# Patient Record
Sex: FEMALE | Race: WHITE | NOT HISPANIC OR LATINO | Employment: FULL TIME | URBAN - METROPOLITAN AREA
[De-identification: names, ages, dates, MRNs, and addresses within clinical notes are randomized per-mention and may not be internally consistent; named-entity substitution may affect disease eponyms.]

---

## 2017-02-08 ENCOUNTER — HOSPITAL ENCOUNTER (EMERGENCY)
Facility: HOSPITAL | Age: 42
Discharge: HOME | End: 2017-02-08
Attending: EMERGENCY MEDICINE | Admitting: EMERGENCY MEDICINE

## 2017-02-08 VITALS
DIASTOLIC BLOOD PRESSURE: 95 MMHG | BODY MASS INDEX: 22.08 KG/M2 | RESPIRATION RATE: 18 BRPM | TEMPERATURE: 98.8 F | HEIGHT: 62 IN | HEART RATE: 69 BPM | OXYGEN SATURATION: 97 % | SYSTOLIC BLOOD PRESSURE: 142 MMHG | WEIGHT: 120 LBS

## 2017-02-08 DIAGNOSIS — J20.9 ACUTE BRONCHITIS, UNSPECIFIED ORGANISM: Primary | ICD-10-CM

## 2017-02-08 DIAGNOSIS — S03.00XA TMJ (DISLOCATION OF TEMPOROMANDIBULAR JOINT), INITIAL ENCOUNTER: ICD-10-CM

## 2017-02-08 PROCEDURE — 99282 EMERGENCY DEPT VISIT SF MDM: CPT

## 2017-02-08 PROCEDURE — 99284 EMERGENCY DEPT VISIT MOD MDM: CPT | Performed by: EMERGENCY MEDICINE

## 2017-02-08 RX ORDER — ONDANSETRON 4 MG/1
4 TABLET, ORALLY DISINTEGRATING ORAL EVERY 6 HOURS PRN
Qty: 20 TABLET | Refills: 0 | Status: SHIPPED | OUTPATIENT
Start: 2017-02-08 | End: 2018-05-16

## 2017-02-08 RX ORDER — AZITHROMYCIN 250 MG/1
TABLET, FILM COATED ORAL
Qty: 6 TABLET | Refills: 0 | Status: SHIPPED | OUTPATIENT
Start: 2017-02-08 | End: 2018-05-16

## 2017-02-08 RX ORDER — BENZONATATE 200 MG/1
200 CAPSULE ORAL 3 TIMES DAILY PRN
Qty: 21 CAPSULE | Refills: 0 | Status: SHIPPED | OUTPATIENT
Start: 2017-02-08 | End: 2017-02-15

## 2017-02-08 RX ORDER — NABUMETONE 750 MG/1
750 TABLET, FILM COATED ORAL 2 TIMES DAILY PRN
Qty: 14 TABLET | Refills: 0 | Status: SHIPPED | OUTPATIENT
Start: 2017-02-08 | End: 2017-02-15

## 2017-02-08 RX ORDER — IBUPROFEN 600 MG/1
600 TABLET ORAL EVERY 6 HOURS PRN
COMMUNITY
End: 2019-04-16

## 2017-02-08 NOTE — DISCHARGE INSTRUCTIONS
Medications as above.  Follow-up as above.  Recommend a soft diet for next 2-3 days secondary to TMJ issues.  Advance as tolerated.  Recommend stop smoking.  Return to ED for medical emergencies.    Hypertension  Hypertension, commonly called high blood pressure, is when the force of blood pumping through your arteries is too strong. Your arteries are the blood vessels that carry blood from your heart throughout your body. A blood pressure reading consists of a higher number over a lower number, such as 110/72. The higher number (systolic) is the pressure inside your arteries when your heart pumps. The lower number (diastolic) is the pressure inside your arteries when your heart relaxes. Ideally you want your blood pressure below 120/80.  Hypertension forces your heart to work harder to pump blood. Your arteries may become narrow or stiff. Having untreated or uncontrolled hypertension can cause heart attack, stroke, kidney disease, and other problems.  RISK FACTORS  Some risk factors for high blood pressure are controllable. Others are not.   Risk factors you cannot control include:   · Race. You may be at higher risk if you are .  · Age. Risk increases with age.  · Gender. Men are at higher risk than women before age 45 years. After age 65, women are at higher risk than men.  Risk factors you can control include:  · Not getting enough exercise or physical activity.  · Being overweight.  · Getting too much fat, sugar, calories, or salt in your diet.  · Drinking too much alcohol.  SIGNS AND SYMPTOMS  Hypertension does not usually cause signs or symptoms. Extremely high blood pressure (hypertensive crisis) may cause headache, anxiety, shortness of breath, and nosebleed.  DIAGNOSIS  To check if you have hypertension, your health care provider will measure your blood pressure while you are seated, with your arm held at the level of your heart. It should be measured at least twice using the same arm.  Certain conditions can cause a difference in blood pressure between your right and left arms. A blood pressure reading that is higher than normal on one occasion does not mean that you need treatment. If it is not clear whether you have high blood pressure, you may be asked to return on a different day to have your blood pressure checked again. Or, you may be asked to monitor your blood pressure at home for 1 or more weeks.  TREATMENT  Treating high blood pressure includes making lifestyle changes and possibly taking medicine. Living a healthy lifestyle can help lower high blood pressure. You may need to change some of your habits.  Lifestyle changes may include:  · Following the DASH diet. This diet is high in fruits, vegetables, and whole grains. It is low in salt, red meat, and added sugars.  · Keep your sodium intake below 2,300 mg per day.  · Getting at least 30-45 minutes of aerobic exercise at least 4 times per week.  · Losing weight if necessary.  · Not smoking.  · Limiting alcoholic beverages.  · Learning ways to reduce stress.  Your health care provider may prescribe medicine if lifestyle changes are not enough to get your blood pressure under control, and if one of the following is true:  · You are 18-59 years of age and your systolic blood pressure is above 140.  · You are 60 years of age or older, and your systolic blood pressure is above 150.  · Your diastolic blood pressure is above 90.  · You have diabetes, and your systolic blood pressure is over 140 or your diastolic blood pressure is over 90.  · You have kidney disease and your blood pressure is above 140/90.  · You have heart disease and your blood pressure is above 140/90.  Your personal target blood pressure may vary depending on your medical conditions, your age, and other factors.  HOME CARE INSTRUCTIONS  · Have your blood pressure rechecked as directed by your health care provider.    · Take medicines only as directed by your health care  provider. Follow the directions carefully. Blood pressure medicines must be taken as prescribed. The medicine does not work as well when you skip doses. Skipping doses also puts you at risk for problems.  · Do not smoke.    · Monitor your blood pressure at home as directed by your health care provider.   SEEK MEDICAL CARE IF:   · You think you are having a reaction to medicines taken.  · You have recurrent headaches or feel dizzy.  · You have swelling in your ankles.  · You have trouble with your vision.  SEEK IMMEDIATE MEDICAL CARE IF:  · You develop a severe headache or confusion.  · You have unusual weakness, numbness, or feel faint.  · You have severe chest or abdominal pain.  · You vomit repeatedly.  · You have trouble breathing.  MAKE SURE YOU:   · Understand these instructions.  · Will watch your condition.  · Will get help right away if you are not doing well or get worse.     This information is not intended to replace advice given to you by your health care provider. Make sure you discuss any questions you have with your health care provider.     Document Released: 12/18/2006 Document Revised: 05/03/2016 Document Reviewed: 10/10/2014  [email or web address] Interactive Patient Education ©2016 [email or web address]

## 2017-02-08 NOTE — ED PROVIDER NOTES
Subjective   Patient is a 41 y.o. female presenting with URI.   History provided by:  Patient  URI   Presenting symptoms: congestion, cough, ear pain, facial pain (R jaw), fever (101.3) and sore throat (mild)    Cough:     Cough characteristics:  Productive    Sputum characteristics:  Green    Severity:  Moderate    Duration:  3 days    Timing:  Intermittent    Progression:  Unchanged    Chronicity:  New  Severity:  Mild  Duration:  3 days  Timing:  Constant  Chronicity:  New  Relieved by:  None tried  Associated symptoms: no headaches and no wheezing    Risk factors: chronic respiratory disease    Risk factors: no immunosuppression, no recent illness and no sick contacts      HPI Narrative:Ms. Pruitt is a 40 yo WF who presents secondary to jaw pain ×1 week, sore throat and URI abdomen ×3 days. Patient reports she has a history of TMJ.  She noted pain in her right jaw 1 week ago.  The pain radiates to her right ear.  The pain has progressively worsened over the past week.  3 days ago patient developed symptoms of URI which include fever to 101.3, sore throat, cough with green sputum, nasal congestion.  Patient has history of COPD and prior pneumonia.  Patient has been taking ibuprofen for jaw pain.  While she reports is somewhat helpful it is causing nausea.  Patient presents for evaluation.  Patient has not seen a physician or dentist about the symptoms until today.  Patient is edentulous. States since she has no teeth why should she see a dentist.      Review of Systems   Constitutional: Positive for fever (101.3). Negative for appetite change and diaphoresis.   HENT: Positive for congestion, ear pain and sore throat (mild).    Eyes: Negative.    Respiratory: Positive for cough. Negative for chest tightness, shortness of breath and wheezing.    Cardiovascular: Negative for chest pain, palpitations and leg swelling.   Genitourinary: Negative.  Negative for difficulty urinating, flank pain, frequency and hematuria.    Musculoskeletal: Negative.    Skin: Negative.  Negative for color change, pallor and rash.   Neurological: Negative.  Negative for dizziness, seizures, syncope and headaches.   Psychiatric/Behavioral: Negative.  Negative for agitation, behavioral problems and hallucinations.       Past Medical History   Diagnosis Date   • Anxiety and depression    • Asthma    • COPD (chronic obstructive pulmonary disease)    • Hypertension    • TMJ (dislocation of temporomandibular joint)        Allergies   Allergen Reactions   • Amoxicillin    • Penicillins        Past Surgical History   Procedure Laterality Date   • Ear tubes     • Tubal abdominal ligation         Family History   Problem Relation Age of Onset   • Hypertension Father    • Alzheimer's disease Paternal Grandmother    • Emphysema Paternal Grandfather        Social History     Social History   • Marital status:      Spouse name: N/A   • Number of children: N/A   • Years of education: N/A     Social History Main Topics   • Smoking status: Heavy Tobacco Smoker     Packs/day: 1.00   • Smokeless tobacco: None   • Alcohol use Yes      Comment: occ   • Drug use: No   • Sexual activity: Yes      Comment: e-sure     Other Topics Concern   • None     Social History Narrative   • None           Objective   Physical Exam   Constitutional: She is oriented to person, place, and time. She appears well-developed and well-nourished. No distress.   41-year-old white female laying in bed.  She appears in good overall health for age.  She is nontoxic in appearance.   HENT:   Head: Normocephalic and atraumatic.   Right Ear: External ear normal.   Left Ear: External ear normal.   Nose: Nose normal.   Mouth/Throat: Oropharynx is clear and moist.   Patient is edentulous   Eyes: Conjunctivae and EOM are normal. Pupils are equal, round, and reactive to light.   Neck: Normal range of motion. Neck supple.   Cardiovascular: Normal rate, regular rhythm, normal heart sounds and intact  distal pulses.  Exam reveals no gallop and no friction rub.    No murmur heard.  Pulmonary/Chest: Effort normal and breath sounds normal.   Abdominal: Soft. Bowel sounds are normal. She exhibits no distension. There is no tenderness.   Musculoskeletal: Normal range of motion.   Neurological: She is alert and oriented to person, place, and time.   Skin: Skin is warm and dry. She is not diaphoretic.   Psychiatric: She has a normal mood and affect. Her behavior is normal.   Nursing note and vitals reviewed.      Procedures         ED Course  ED Course   Comment By Time   02/08/17  9:08 AM  Will treat with Z-Aniceto for bronchitis.  This will also cover for strep although patient's oral exam is unremarkable.  TMs are unremarkable.  I feel patient's jaw pain is secondary to TMJ.  Giving TMJ specialist information. Jaguar Cortez MD 02/08 5424                  MDM  Number of Diagnoses or Management Options  Acute bronchitis, unspecified organism: new and does not require workup  TMJ (dislocation of temporomandibular joint), initial encounter: established and worsening  Risk of Complications, Morbidity, and/or Mortality  Presenting problems: low  Diagnostic procedures: low  Management options: low    Patient Progress  Patient progress: stable      Final diagnoses:   Acute bronchitis, unspecified organism   TMJ (dislocation of temporomandibular joint), initial encounter            Jaguar Cortez MD  02/08/17 0955

## 2018-01-27 ENCOUNTER — HOSPITAL ENCOUNTER (EMERGENCY)
Facility: HOSPITAL | Age: 43
Discharge: HOME | End: 2018-01-27
Attending: EMERGENCY MEDICINE | Admitting: EMERGENCY MEDICINE

## 2018-01-27 ENCOUNTER — APPOINTMENT (OUTPATIENT)
Dept: CT IMAGING | Facility: HOSPITAL | Age: 43
End: 2018-01-27

## 2018-01-27 ENCOUNTER — APPOINTMENT (OUTPATIENT)
Dept: GENERAL RADIOLOGY | Facility: HOSPITAL | Age: 43
End: 2018-01-27

## 2018-01-27 VITALS
BODY MASS INDEX: 21.16 KG/M2 | SYSTOLIC BLOOD PRESSURE: 143 MMHG | HEIGHT: 62 IN | OXYGEN SATURATION: 96 % | WEIGHT: 115 LBS | RESPIRATION RATE: 20 BRPM | TEMPERATURE: 97.7 F | DIASTOLIC BLOOD PRESSURE: 93 MMHG | HEART RATE: 55 BPM

## 2018-01-27 DIAGNOSIS — R91.1 LUNG NODULE: ICD-10-CM

## 2018-01-27 DIAGNOSIS — S09.90XA INJURY OF HEAD, INITIAL ENCOUNTER: ICD-10-CM

## 2018-01-27 DIAGNOSIS — J06.9 UPPER RESPIRATORY TRACT INFECTION, UNSPECIFIED TYPE: Primary | ICD-10-CM

## 2018-01-27 LAB
FLUAV AG NPH QL: NEGATIVE
FLUBV AG NPH QL IA: NEGATIVE

## 2018-01-27 PROCEDURE — 70450 CT HEAD/BRAIN W/O DYE: CPT

## 2018-01-27 PROCEDURE — 99284 EMERGENCY DEPT VISIT MOD MDM: CPT | Performed by: PHYSICIAN ASSISTANT

## 2018-01-27 PROCEDURE — 87804 INFLUENZA ASSAY W/OPTIC: CPT | Performed by: EMERGENCY MEDICINE

## 2018-01-27 PROCEDURE — 99283 EMERGENCY DEPT VISIT LOW MDM: CPT

## 2018-01-27 PROCEDURE — 71046 X-RAY EXAM CHEST 2 VIEWS: CPT

## 2018-01-27 RX ORDER — BENZONATATE 100 MG/1
100 CAPSULE ORAL 3 TIMES DAILY PRN
Qty: 20 CAPSULE | Refills: 0 | Status: SHIPPED | OUTPATIENT
Start: 2018-01-27 | End: 2018-02-03

## 2018-01-27 RX ORDER — METHYLPREDNISOLONE 4 MG/1
TABLET ORAL
Qty: 21 TABLET | Refills: 0 | Status: SHIPPED | OUTPATIENT
Start: 2018-01-27 | End: 2019-04-16

## 2018-01-27 RX ORDER — ALBUTEROL SULFATE 90 UG/1
2 AEROSOL, METERED RESPIRATORY (INHALATION) EVERY 4 HOURS PRN
Qty: 1 INHALER | Refills: 0 | Status: SHIPPED | OUTPATIENT
Start: 2018-01-27 | End: 2021-08-19

## 2018-01-27 RX ORDER — BUDESONIDE AND FORMOTEROL FUMARATE DIHYDRATE 160; 4.5 UG/1; UG/1
2 AEROSOL RESPIRATORY (INHALATION)
Qty: 1 INHALER | Refills: 0 | Status: SHIPPED | OUTPATIENT
Start: 2018-01-27 | End: 2019-04-16

## 2018-01-27 RX ORDER — ONDANSETRON 4 MG/1
4 TABLET, ORALLY DISINTEGRATING ORAL 4 TIMES DAILY PRN
Qty: 12 TABLET | Refills: 0 | Status: SHIPPED | OUTPATIENT
Start: 2018-01-27 | End: 2018-05-16

## 2018-01-27 RX ORDER — BENZONATATE 100 MG/1
100 CAPSULE ORAL ONCE
Status: COMPLETED | OUTPATIENT
Start: 2018-01-27 | End: 2018-01-27

## 2018-01-27 RX ADMIN — BENZONATATE 100 MG: 100 CAPSULE ORAL at 17:51

## 2018-01-27 NOTE — ED PROVIDER NOTES
Subjective   History of Present Illness  History of Present Illness    Chief complaint: Flu symptoms    Location: generalized    Quality/Severity:  Aches, moderate    Timing/Duration: 2 weeks    Modifying Factors: nothing makes worse.  Albuterol helps breathing.     Associated Symptoms: + productive cough with green sputum + rhinorrhea +wheezes.  Denies fevers or chills.  Denies chest pain or shortness of breath.  Denies abdominal pain or nausea/vomiting. Denies sore throat    Narrative: 43 y/o female presents with flulike symptoms that started 2 weeks.  She continues to cough.  She isn't having to use her inhaler much more frequently than usual.  She denies any hemoptysis.  She also states that she recently hit her head on encounter.  She denied any LOC.  She has been having headaches ever since.    Review of Systems  General: Denies fevers or chills.  Denies any weakness or fatigue.  Denies any weight loss or weight gain.  SKIN: Denies any rashes lesions or ulcers.  Denies color change.  ENT: + sore throat + rhinorrhea.  Denies ear pain.    EYES: Denies any blurred vision.  Denies any change in vision.  Denies any photophobia.  Denies any vision loss.  LUNGS: Denies any shortness of breath or wheezing.  + cough.  Denies any hemoptysis.  CARDIAC: Denies any chest pain.  Denies palpitations.  Denies syncope.  Denies any edema  ABD: Denies any abdominal pain.  Denies any nausea or vomiting or diarrhea.  Denies any rectal bleeding.  Denies constipation  : Denies any dysuria, urgency, frequency or hematuria.  Denies discharge.  Denies flank pain.  NEURO: Denies any focal weakness.  Denies headache.  Denies seizures.  Denies changes in speech or difficulty walking.  ENDOCRINE: Denies polydipsia and polyuria  M/S: Denies arthralgias, back pain, myalgias or neck pain  HEME/LYMPH: Negative for adenopathy. Does not bruise/bleed easily.   PSYCH: Negative for suicidal ideas. Denies anxiety or depression  review was  performed in addition to those in the above all other reviews are negative.      Past Medical History:   Diagnosis Date   • Anxiety and depression    • Asthma    • COPD (chronic obstructive pulmonary disease)    • Hypertension    • TMJ (dislocation of temporomandibular joint)        Allergies   Allergen Reactions   • Amoxicillin    • Penicillins        Past Surgical History:   Procedure Laterality Date   • EAR TUBES     • TUBAL ABDOMINAL LIGATION         Family History   Problem Relation Age of Onset   • Hypertension Father    • Alzheimer's disease Paternal Grandmother    • Emphysema Paternal Grandfather        Social History     Social History   • Marital status:      Spouse name: N/A   • Number of children: N/A   • Years of education: N/A     Social History Main Topics   • Smoking status: Heavy Tobacco Smoker     Packs/day: 1.00   • Smokeless tobacco: None   • Alcohol use Yes      Comment: occ   • Drug use: No   • Sexual activity: Yes      Comment: e-sure     Other Topics Concern   • None     Social History Narrative   No current facility-administered medications for this encounter.     Current Outpatient Prescriptions:   •  budesonide-formoterol (SYMBICORT) 160-4.5 MCG/ACT inhaler, Inhale 2 puffs 2 (two) times a day., Disp: 1 inhaler, Rfl: 0  •  Pseudoeph-Doxylamine-DM-APAP (NYQUIL PO), Take 4 teaspoon(s) by mouth Every Night., Disp: , Rfl:   •  albuterol (PROVENTIL HFA;VENTOLIN HFA) 108 (90 BASE) MCG/ACT inhaler, Inhale 2 puffs every 4 (four) hours as needed for wheezing., Disp: 1 inhaler, Rfl: 0  •  azithromycin (ZITHROMAX Z-MATTHEW) 250 MG tablet, Take 2 tablets the first day, then 1 tablet daily for 4 days., Disp: 6 tablet, Rfl: 0  •  azithromycin (ZITHROMAX) 250 MG tablet, Take 2 tablets the first day, then 1 tablet daily for 4 days., Disp: 6 tablet, Rfl: 0  •  cetirizine (ZyrTEC) 10 MG tablet, Take 10 mg by mouth daily., Disp: , Rfl:   •  fluticasone (FLOVENT DISKUS) 50 MCG/BLIST diskus inhaler, Inhale  1 puff 2 (two) times a day., Disp: , Rfl:   •  HYDROcodone-homatropine (HYCODAN) 5-1.5 MG/5ML syrup, Take 5 mL by mouth every 4 (four) hours as needed for cough., Disp: 120 mL, Rfl: 0  •  ibuprofen (ADVIL,MOTRIN) 600 MG tablet, Take 600 mg by mouth Every 6 (Six) Hours As Needed for mild pain (1-3)., Disp: , Rfl:   •  ibuprofen (CHILD IBUPROFEN) 100 MG/5ML suspension, Take 20 mL by mouth every 6 (six) hours as needed for mild pain (1-3) or fever., Disp: 473 mL, Rfl: 0  •  lisinopril (PRINIVIL,ZESTRIL) 10 MG tablet, Take 10 mg by mouth daily., Disp: , Rfl:   •  Omeprazole 20 MG tablet delayed-release, Take 20 mg by mouth Daily., Disp: , Rfl:   •  ondansetron ODT (ZOFRAN ODT) 4 MG disintegrating tablet, Take 1 tablet by mouth Every 6 (Six) Hours As Needed for nausea or vomiting for up to 20 doses., Disp: 20 tablet, Rfl: 0  •  phenylephrine (SUPA-SYNEPHRINE) 1 % nasal spray, 2 drops into each nostril every 6 (six) hours as needed for congestion., Disp: 29.6 mL, Rfl: 0  •  promethazine (PHENERGAN) 25 MG tablet, Take 25 mg by mouth every 6 (six) hours as needed for nausea or vomiting., Disp: , Rfl:   •  psyllium (METAMUCIL) 58.6 % powder, Take by mouth 3 (three) times a day., Disp: , Rfl:   •  sertraline (ZOLOFT) 50 MG tablet, Take 50 mg by mouth daily., Disp: , Rfl:   •  sulfamethoxazole-trimethoprim (BACTRIM DS,SEPTRA DS) 800-160 MG per tablet, Take one tablet by mouth twice daily for 7 days, Disp: 14 tablet, Rfl: 0          Objective   Physical Exam  Vitals:    01/27/18 1612   BP: 151/88   Pulse: 83   Resp: 20   Temp: 98.1 °F (36.7 °C)   SpO2: 96%       GENERAL: Alert and oriented ×4.  No apparent distress. GCS 15  SKIN: Warm, pink and dry  HEENT: Atraumatic normocephalic, TM bilaterally.  Oropharynx clear without pharyngeal erythema, edema or exudate.  No lymphadenopathy.  LUNGS: Clear to auscultation bilaterally without wheezes, rales or rhonchi, no distress  CARDIAC: Regular rate and rhythm.  S1 and S2.  No murmurs,  rubs or gallops.  ABD: Soft, nontender  M/S: MAEW, no deformity  NEURO: No gross focal deficits.  Cranial nerves II through XII are grossly intact  PSYCH: Normal mood and affect    Procedures         ED Course  ED Course      Tessalon given.  Results for orders placed or performed during the hospital encounter of 01/27/18   Influenza Antigen, Rapid - Swab, Nasopharynx   Result Value Ref Range    Influenza A Ag, EIA Negative Negative    Influenza B Ag, EIA Negative Negative     Reviewed CXR. Independently viewed by me. Interpreted by me. Discussed with patient that final radiology read would be done tomorrow by the radiologist. RLL nodule, NAD.  Reviewed head CT. Independently viewed by me. Interpreted by radiologist. Discussed with pt. No acute intracranial findings.  Extensive paranasal sinus thickening.      Discussed pertinent labs and imaging findings with the patient/family.  Patient/Family voiced understanding of need to follow-up for recheck, further testing as needed.  Return to the emergency Department warnings were given.  D/c with refills for albuterol/symbicort.  Medrol dosepak.              MDM  Number of Diagnoses or Management Options  Injury of head, initial encounter: new and requires workup  Upper respiratory tract infection, unspecified type: new and requires workup     Amount and/or Complexity of Data Reviewed  Clinical lab tests: reviewed and ordered  Tests in the radiology section of CPT®: ordered and reviewed  Tests in the medicine section of CPT®: ordered and reviewed  Independent visualization of images, tracings, or specimens: yes    Risk of Complications, Morbidity, and/or Mortality  Presenting problems: moderate  Diagnostic procedures: moderate  Management options: moderate    Patient Progress  Patient progress: improved      Final diagnoses:   Upper respiratory tract infection, unspecified type   Injury of head, initial encounter   Lung nodule       Dictated utilizing Dragon  dictation       Cathie Burris PA-C  01/27/18 1924       Cathie Burris PA-C  01/27/18 1933  Called in Zpak to [facility], pt was there getting rx (symbicort not covered, so changed to Advair).  LM for pt to call, but pt is currently filling prescriptions, so she will also get zpak.     Cathie Burris PA-C  01/28/18 1511  Pt called back.  Updated her on findings.     Cathie Burris PA-C  01/28/18 [id number]

## 2018-01-28 NOTE — ED PROVIDER NOTES
"Subjective   History of Present Illness    Review of Systems    Past Medical History:   Diagnosis Date   • Anxiety and depression    • Asthma    • COPD (chronic obstructive pulmonary disease)    • Hypertension    • TMJ (dislocation of temporomandibular joint)        Allergies   Allergen Reactions   • Amoxicillin    • Penicillins        Past Surgical History:   Procedure Laterality Date   • EAR TUBES     • TUBAL ABDOMINAL LIGATION         Family History   Problem Relation Age of Onset   • Hypertension Father    • Alzheimer's disease Paternal Grandmother    • Emphysema Paternal Grandfather        Social History     Social History   • Marital status:      Spouse name: N/A   • Number of children: N/A   • Years of education: N/A     Social History Main Topics   • Smoking status: Heavy Tobacco Smoker     Packs/day: 1.00   • Smokeless tobacco: None   • Alcohol use Yes      Comment: occ   • Drug use: No   • Sexual activity: Yes      Comment: e-sure     Other Topics Concern   • None     Social History Narrative           Objective   Physical Exam    Procedures         ED Course  ED Course                  MDM    Final diagnoses:   Upper respiratory tract infection, unspecified type   Injury of head, initial encounter   Lung nodule       Addendum: Final radiology report noted a \"probable small patchy infiltrate at the left base\".  Voice message left on the patient's cell phone to return the call.     Damion Mcmillan MD  01/28/18 0925    "

## 2018-01-28 NOTE — DISCHARGE INSTRUCTIONS
Return to the emergency department with worsening symptoms, uncontrolled pain, inability to tolerate oral liquids, fever greater than 101°F not controlled by Tylenol or as needed with emergent concerns.    Take mucinex otc as needed as directed for congestion.  Also use netti pot or saline nasal spray as needed as directed.

## 2018-02-03 ENCOUNTER — HOSPITAL ENCOUNTER (EMERGENCY)
Facility: HOSPITAL | Age: 43
Discharge: HOME | End: 2018-02-03
Attending: EMERGENCY MEDICINE | Admitting: EMERGENCY MEDICINE

## 2018-02-03 ENCOUNTER — APPOINTMENT (OUTPATIENT)
Dept: GENERAL RADIOLOGY | Facility: HOSPITAL | Age: 43
End: 2018-02-03

## 2018-02-03 VITALS
BODY MASS INDEX: 21.1 KG/M2 | TEMPERATURE: 98.2 F | SYSTOLIC BLOOD PRESSURE: 117 MMHG | DIASTOLIC BLOOD PRESSURE: 71 MMHG | HEIGHT: 62 IN | OXYGEN SATURATION: 97 % | HEART RATE: 69 BPM | WEIGHT: 114.64 LBS | RESPIRATION RATE: 18 BRPM

## 2018-02-03 DIAGNOSIS — J01.41 ACUTE RECURRENT PANSINUSITIS: ICD-10-CM

## 2018-02-03 DIAGNOSIS — R11.2 NON-INTRACTABLE VOMITING WITH NAUSEA, UNSPECIFIED VOMITING TYPE: Primary | ICD-10-CM

## 2018-02-03 DIAGNOSIS — J18.9 PNEUMONIA OF RIGHT LOWER LOBE DUE TO INFECTIOUS ORGANISM: ICD-10-CM

## 2018-02-03 LAB
ALBUMIN SERPL-MCNC: 3.5 G/DL (ref 3.5–5.2)
ALBUMIN/GLOB SERPL: 1 G/DL
ALP SERPL-CCNC: 98 U/L (ref 40–129)
ALT SERPL W P-5'-P-CCNC: 11 U/L (ref 5–33)
ANION GAP SERPL CALCULATED.3IONS-SCNC: 9.1 MMOL/L
AST SERPL-CCNC: 20 U/L (ref 5–32)
BACTERIA UR QL AUTO: ABNORMAL /HPF
BASOPHILS # BLD AUTO: 0.01 10*3/MM3 (ref 0–0.2)
BASOPHILS NFR BLD AUTO: 0.2 % (ref 0–2)
BILIRUB SERPL-MCNC: <0.2 MG/DL (ref 0.2–1.2)
BILIRUB UR QL STRIP: NEGATIVE
BUN BLD-MCNC: 6 MG/DL (ref 6–20)
BUN/CREAT SERPL: 9.7 (ref 7–25)
CALCIUM SPEC-SCNC: 8.1 MG/DL (ref 8.6–10.5)
CHLORIDE SERPL-SCNC: 96 MMOL/L (ref 98–107)
CLARITY UR: CLEAR
CO2 SERPL-SCNC: 29.9 MMOL/L (ref 22–29)
COLOR UR: YELLOW
CREAT BLD-MCNC: 0.62 MG/DL (ref 0.57–1)
DEPRECATED RDW RBC AUTO: 43.8 FL (ref 37–54)
EOSINOPHIL # BLD AUTO: 0.01 10*3/MM3 (ref 0.1–0.3)
EOSINOPHIL NFR BLD AUTO: 0.2 % (ref 0–4)
ERYTHROCYTE [DISTWIDTH] IN BLOOD BY AUTOMATED COUNT: 11.9 % (ref 11.5–14.5)
FLUAV AG NPH QL: NEGATIVE
FLUBV AG NPH QL IA: NEGATIVE
GFR SERPL CREATININE-BSD FRML MDRD: 107 ML/MIN/1.73
GLOBULIN UR ELPH-MCNC: 3.6 GM/DL
GLUCOSE BLD-MCNC: 79 MG/DL (ref 65–99)
GLUCOSE UR STRIP-MCNC: NEGATIVE MG/DL
HCT VFR BLD AUTO: 39.5 % (ref 37–47)
HGB BLD-MCNC: 13.2 G/DL (ref 12–16)
HGB UR QL STRIP.AUTO: NEGATIVE
HYALINE CASTS UR QL AUTO: ABNORMAL /LPF
IMM GRANULOCYTES # BLD: 0.01 10*3/MM3 (ref 0–0.03)
IMM GRANULOCYTES NFR BLD: 0.2 % (ref 0–0.5)
KETONES UR QL STRIP: NEGATIVE
LEUKOCYTE ESTERASE UR QL STRIP.AUTO: NEGATIVE
LYMPHOCYTES # BLD AUTO: 1.52 10*3/MM3 (ref 0.6–4.8)
LYMPHOCYTES NFR BLD AUTO: 23.1 % (ref 20–45)
MCH RBC QN AUTO: 33.5 PG (ref 27–31)
MCHC RBC AUTO-ENTMCNC: 33.4 G/DL (ref 31–37)
MCV RBC AUTO: 100.3 FL (ref 81–99)
MONOCYTES # BLD AUTO: 0.52 10*3/MM3 (ref 0–1)
MONOCYTES NFR BLD AUTO: 7.9 % (ref 3–8)
MUCOUS THREADS URNS QL MICRO: ABNORMAL /HPF
NEUTROPHILS # BLD AUTO: 4.52 10*3/MM3 (ref 1.5–8.3)
NEUTROPHILS NFR BLD AUTO: 68.4 % (ref 45–70)
NITRITE UR QL STRIP: NEGATIVE
NRBC BLD MANUAL-RTO: 0 /100 WBC (ref 0–0)
PH UR STRIP.AUTO: 6 [PH] (ref 4.5–8)
PLATELET # BLD AUTO: 224 10*3/MM3 (ref 140–500)
PMV BLD AUTO: 9.9 FL (ref 7.4–10.4)
POTASSIUM BLD-SCNC: 2.9 MMOL/L (ref 3.5–5.2)
PROT SERPL-MCNC: 7.1 G/DL (ref 6–8.5)
PROT UR QL STRIP: ABNORMAL
RBC # BLD AUTO: 3.94 10*6/MM3 (ref 4.2–5.4)
RBC # UR: ABNORMAL /HPF
REF LAB TEST METHOD: ABNORMAL
SODIUM BLD-SCNC: 135 MMOL/L (ref 136–145)
SP GR UR STRIP: 1.02 (ref 1–1.03)
SQUAMOUS #/AREA URNS HPF: ABNORMAL /HPF
UROBILINOGEN UR QL STRIP: ABNORMAL
WBC NRBC COR # BLD: 6.59 10*3/MM3 (ref 4.8–10.8)
WBC UR QL AUTO: ABNORMAL /HPF

## 2018-02-03 PROCEDURE — 87086 URINE CULTURE/COLONY COUNT: CPT | Performed by: EMERGENCY MEDICINE

## 2018-02-03 PROCEDURE — 71046 X-RAY EXAM CHEST 2 VIEWS: CPT

## 2018-02-03 PROCEDURE — 99284 EMERGENCY DEPT VISIT MOD MDM: CPT | Performed by: EMERGENCY MEDICINE

## 2018-02-03 PROCEDURE — 99283 EMERGENCY DEPT VISIT LOW MDM: CPT

## 2018-02-03 PROCEDURE — 85025 COMPLETE CBC W/AUTO DIFF WBC: CPT | Performed by: EMERGENCY MEDICINE

## 2018-02-03 PROCEDURE — 80053 COMPREHEN METABOLIC PANEL: CPT | Performed by: EMERGENCY MEDICINE

## 2018-02-03 PROCEDURE — 87804 INFLUENZA ASSAY W/OPTIC: CPT | Performed by: EMERGENCY MEDICINE

## 2018-02-03 PROCEDURE — 81001 URINALYSIS AUTO W/SCOPE: CPT | Performed by: EMERGENCY MEDICINE

## 2018-02-03 RX ORDER — POTASSIUM CHLORIDE 750 MG/1
10 TABLET, FILM COATED, EXTENDED RELEASE ORAL 2 TIMES DAILY
Qty: 20 TABLET | Refills: 0 | Status: SHIPPED | OUTPATIENT
Start: 2018-02-03 | End: 2019-04-16

## 2018-02-03 RX ORDER — CEPHALEXIN 500 MG/1
500 CAPSULE ORAL 3 TIMES DAILY
Qty: 30 CAPSULE | Refills: 0 | Status: SHIPPED | OUTPATIENT
Start: 2018-02-03 | End: 2018-05-16

## 2018-02-03 RX ORDER — POTASSIUM CHLORIDE 20 MEQ/1
40 TABLET, EXTENDED RELEASE ORAL ONCE
Status: COMPLETED | OUTPATIENT
Start: 2018-02-03 | End: 2018-02-03

## 2018-02-03 RX ORDER — ONDANSETRON 4 MG/1
4 TABLET, ORALLY DISINTEGRATING ORAL ONCE
Status: COMPLETED | OUTPATIENT
Start: 2018-02-03 | End: 2018-02-03

## 2018-02-03 RX ORDER — ONDANSETRON 2 MG/ML
4 INJECTION INTRAMUSCULAR; INTRAVENOUS ONCE
Status: DISCONTINUED | OUTPATIENT
Start: 2018-02-03 | End: 2018-02-03

## 2018-02-03 RX ADMIN — POTASSIUM CHLORIDE 40 MEQ: 1500 TABLET, EXTENDED RELEASE ORAL at 21:29

## 2018-02-03 RX ADMIN — ONDANSETRON 4 MG: 4 TABLET, ORALLY DISINTEGRATING ORAL at 20:43

## 2018-02-04 NOTE — ED NOTES
"Pt states that she \"feels a little better\" and was provided sprite.  Appears to be tolerating well.     Karol Wood RN  02/03/18 5013    "

## 2018-02-04 NOTE — DISCHARGE INSTRUCTIONS
Community-Acquired Pneumonia, Adult  Pneumonia is an infection of the lungs. There are different types of pneumonia. One type can develop while a person is in a hospital. A different type, called community-acquired pneumonia, develops in people who are not, or have not recently been, in the hospital or other health care facility.  What are the causes?  Pneumonia may be caused by bacteria, viruses, or funguses. Community-acquired pneumonia is often caused by Streptococcus pneumonia bacteria. These bacteria are often passed from one person to another by breathing in droplets from the cough or sneeze of an infected person.  What increases the risk?  The condition is more likely to develop in:  · People who have chronic diseases, such as chronic obstructive pulmonary disease (COPD), asthma, congestive heart failure, cystic fibrosis, diabetes, or kidney disease.  · People who have early-stage or late-stage HIV.  · People who have sickle cell disease.  · People who have had their spleen removed (splenectomy).  · People who have poor dental hygiene.  · People who have medical conditions that increase the risk of breathing in (aspirating) secretions their own mouth and nose.  · People who have a weakened immune system (immunocompromised).  · People who smoke.  · People who travel to areas where pneumonia-causing germs commonly exist.  · People who are around animal habitats or animals that have pneumonia-causing germs, including birds, bats, rabbits, cats, and farm animals.  What are the signs or symptoms?  Symptoms of this condition include:  · A dry cough.  · A wet (productive) cough.  · Fever.  · Sweating.  · Chest pain, especially when breathing deeply or coughing.  · Rapid breathing or difficulty breathing.  · Shortness of breath.  · Shaking chills.  · Fatigue.  · Muscle aches.  How is this diagnosed?  Your health care provider will take a medical history and perform a physical exam. You may also have other tests,  including:  · Imaging studies of your chest, including X-rays.  · Tests to check your blood oxygen level and other blood gases.  · Other tests on blood, mucus (sputum), fluid around your lungs (pleural fluid), and urine.  If your pneumonia is severe, other tests may be done to identify the specific cause of your illness.  How is this treated?  The type of treatment that you receive depends on many factors, such as the cause of your pneumonia, the medicines you take, and other medical conditions that you have. For most adults, treatment and recovery from pneumonia may occur at home. In some cases, treatment must happen in a hospital. Treatment may include:  · Antibiotic medicines, if the pneumonia was caused by bacteria.  · Antiviral medicines, if the pneumonia was caused by a virus.  · Medicines that are given by mouth or through an IV tube.  · Oxygen.  · Respiratory therapy.  Although rare, treating severe pneumonia may include:  · Mechanical ventilation. This is done if you are not breathing well on your own and you cannot maintain a safe blood oxygen level.  · Thoracentesis. This procedure removes fluid around one lung or both lungs to help you breathe better.  Follow these instructions at home:  · Take over-the-counter and prescription medicines only as told by your health care provider.  ¨ Only take cough medicine if you are losing sleep. Understand that cough medicine can prevent your body’s natural ability to remove mucus from your lungs.  ¨ If you were prescribed an antibiotic medicine, take it as told by your health care provider. Do not stop taking the antibiotic even if you start to feel better.  · Sleep in a semi-upright position at night. Try sleeping in a reclining chair, or place a few pillows under your head.  · Do not use tobacco products, including cigarettes, chewing tobacco, and e-cigarettes. If you need help quitting, ask your health care provider.  · Drink enough water to keep your urine clear  or pale yellow. This will help to thin out mucus secretions in your lungs.  How is this prevented?  There are ways that you can decrease your risk of developing community-acquired pneumonia. Consider getting a pneumococcal vaccine if:  · You are older than 65 years of age.  · You are older than 19 years of age and are undergoing cancer treatment, have chronic lung disease, or have other medical conditions that affect your immune system. Ask your health care provider if this applies to you.  There are different types and schedules of pneumococcal vaccines. Ask your health care provider which vaccination option is best for you.  You may also prevent community-acquired pneumonia if you take these actions:  · Get an influenza vaccine every year. Ask your health care provider which type of influenza vaccine is best for you.  · Go to the dentist on a regular basis.  · Wash your hands often. Use hand  if soap and water are not available.  Contact a health care provider if:  · You have a fever.  · You are losing sleep because you cannot control your cough with cough medicine.  Get help right away if:  · You have worsening shortness of breath.  · You have increased chest pain.  · Your sickness becomes worse, especially if you are an older adult or have a weakened immune system.  · You cough up blood.  This information is not intended to replace advice given to you by your health care provider. Make sure you discuss any questions you have with your health care provider.  Document Released: 12/18/2006 Document Revised: 04/27/2017 Document Reviewed: 04/13/2016  [employer] Patient Education © 2017 [employer]      Sinusitis, Adult  Sinusitis is soreness and inflammation of your sinuses. Sinuses are hollow spaces in the bones around your face. They are located:  · Around your eyes.  · In the middle of your forehead.  · Behind your nose.  · In your cheekbones.  Your sinuses and nasal passages are lined with a  stringy fluid (mucus). Mucus normally drains out of your sinuses. When your nasal tissues get inflamed or swollen, the mucus can get trapped or blocked so air cannot flow through your sinuses. This lets bacteria, viruses, and funguses grow, and that leads to infection.  Follow these instructions at home:  Medicines  · Take, use, or apply over-the-counter and prescription medicines only as told by your doctor. These may include nasal sprays.  · If you were prescribed an antibiotic medicine, take it as told by your doctor. Do not stop taking the antibiotic even if you start to feel better.  Hydrate and Humidify  · Drink enough water to keep your pee (urine) clear or pale yellow.  · Use a cool mist humidifier to keep the humidity level in your home above 50%.  · Breathe in steam for 10-15 minutes, 3-4 times a day or as told by your doctor. You can do this in the bathroom while a hot shower is running.  · Try not to spend time in cool or dry air.  Rest  · Rest as much as possible.  · Sleep with your head raised (elevated).  · Make sure to get enough sleep each night.  General instructions  · Put a warm, moist washcloth on your face 3-4 times a day or as told by your doctor. This will help with discomfort.  · Wash your hands often with soap and water. If there is no soap and water, use hand .  · Do not smoke. Avoid being around people who are smoking (secondhand smoke).  · Keep all follow-up visits as told by your doctor. This is important.  Contact a doctor if:  · You have a fever.  · Your symptoms get worse.  · Your symptoms do not get better within 10 days.  Get help right away if:  · You have a very bad headache.  · You cannot stop throwing up (vomiting).  · You have pain or swelling around your face or eyes.  · You have trouble seeing.  · You feel confused.  · Your neck is stiff.  · You have trouble breathing.  This information is not intended to replace advice given to you by your health care provider.  Make sure you discuss any questions you have with your health care provider.  Document Released: 06/05/2009 Document Revised: 08/13/2017 Document Reviewed: 10/12/2016  [employer] Patient Education © 2017 [employer]  Follow-up with Dr. Larsen on Monday.  Return to emerge department if there is vomiting not controlled with the Zofran, vomiting blood, shortness of breath, worsening headache, worse in any way at all.

## 2018-02-04 NOTE — ED PROVIDER NOTES
Subjective   History of Present Illness  History of Present Illness    Chief complaint: Cough, fever, chills    Location: Home    Quality/Severity:  MAXIMUM TEMPERATURE of 102.9    Timing/Onset/Duration: Patient began feeling worse over the last couple of days    Modifying Factors: Nothing seems to make it better, seems to have gotten worse over time    Associated Symptoms: Patient complains of left frontal headache.  Patient complains of MAXIMUM TEMPERATURE of 102.9 day.  He has had chills.  She's had cough.  She has coughed so for full that she throws up.  She denies any sore throat or earache.  She does have nasal congestion.  There is no chest pain or shortness of breath.  No abdominal pain.  No diarrhea or burning when she urinates.  There is no blood in her emesis.    Narrative: This 42-year-old white female, who smokes, and has a history of asthma, presents with fever, chills, and cough.  The patient states that she's had a cough fever and congestion with a left frontal headache for last 2 weeks.  She was seen here on January 27, 2018 and diagnosed with upper respiratory infection.  She also had a lung nodule and fell and had sustained a head injury.  CAT scan of the head at that time was unremarkable except for an acute and chronic finding of pansinusitis..  On the radiology over read of the chest x-ray a pneumonia was noted.  The patient was called in a Z-Aniceto. She finished the Z-Aniceto.  She presents today with fever of 102.9 by history.  The patient complains of left frontal headache.  She has congestion.  She has cough.  No sore throat or earache.  No chest pain or shortness of breath.  No abdominal pain.  No diarrhea or burning when she urinates.  She has body aches.  She has chills.  The patient has an appointment with her primary care provider, Dr. Larsen, on Monday.  She has not seen her primary care provider since she started feeling sick.    PCP:  [facility]      Review of Systems   Constitutional: Positive for  chills and fever.   HENT: Positive for congestion. Negative for ear pain and sore throat.    Eyes: Negative for pain and discharge.   Respiratory: Positive for cough. Negative for chest tightness and shortness of breath.    Cardiovascular: Negative for chest pain and leg swelling.   Gastrointestinal: Positive for vomiting (with forceful cough). Negative for abdominal pain, blood in stool, constipation, diarrhea and nausea.   Genitourinary: Negative for dysuria.   Musculoskeletal: Negative for back pain.   Skin: Negative for rash.   Neurological: Positive for weakness (generalized) and headaches. Negative for numbness.   Hematological: Negative for adenopathy.   Psychiatric/Behavioral: Negative for confusion.        Medication List      ASK your doctor about these medications          albuterol 108 (90 Base) MCG/ACT inhaler   Commonly known as:  PROVENTIL HFA;VENTOLIN HFA   Inhale 2 puffs Every 4 (Four) Hours As Needed for Wheezing.       * azithromycin 250 MG tablet   Commonly known as:  ZITHROMAX   Take 2 tablets the first day, then 1 tablet daily for 4 days.       * azithromycin 250 MG tablet   Commonly known as:  ZITHROMAX Z-MATTHEW   Take 2 tablets the first day, then 1 tablet daily for 4 days.       benzonatate 100 MG capsule   Commonly known as:  TESSALON   Take 1 capsule by mouth 3 (Three) Times a Day As Needed for Cough for up   to 7 days.       budesonide-formoterol 160-4.5 MCG/ACT inhaler   Commonly known as:  SYMBICORT   Inhale 2 puffs 2 (Two) Times a Day.       cetirizine 10 MG tablet   Commonly known as:  zyrTEC       fluticasone 50 MCG/BLIST diskus inhaler   Commonly known as:  FLOVENT DISKUS       HYDROcodone-homatropine 5-1.5 MG/5ML syrup   Commonly known as:  HYCODAN   Take 5 mL by mouth every 4 (four) hours as needed for cough.       * ibuprofen 600 MG tablet   Commonly known as:  ADVIL,MOTRIN       * ibuprofen 100 MG/5ML suspension   Commonly known as:  CHILD IBUPROFEN   Take 20 mL by mouth every 6  (six) hours as needed for mild pain (1-3) or   fever.       lisinopril 10 MG tablet   Commonly known as:  PRINIVIL,ZESTRIL       MethylPREDNISolone 4 MG tablet   Commonly known as:  MEDROL (MATTHEW)   Take as directed on package instructions.       NYQUIL PO       Omeprazole 20 MG tablet delayed-release       * ondansetron ODT 4 MG disintegrating tablet   Commonly known as:  ZOFRAN ODT   Take 1 tablet by mouth Every 6 (Six) Hours As Needed for nausea or   vomiting for up to 20 doses.       * ondansetron ODT 4 MG disintegrating tablet   Commonly known as:  ZOFRAN-ODT   Take 1 tablet by mouth 4 (Four) Times a Day As Needed for Nausea or   Vomiting.       phenylephrine 1 % nasal spray   Commonly known as:  SUPA-SYNEPHRINE   2 drops into each nostril every 6 (six) hours as needed for congestion.       promethazine 25 MG tablet   Commonly known as:  PHENERGAN       psyllium 58.6 % powder   Commonly known as:  METAMUCIL       sertraline 50 MG tablet   Commonly known as:  ZOLOFT       sulfamethoxazole-trimethoprim 800-160 MG per tablet   Commonly known as:  BACTRIM DS,SEPTRA DS   Take one tablet by mouth twice daily for 7 days       * Notice:  This list has 6 medication(s) that are the same as other   medications prescribed for you. Read the directions carefully, and ask   your doctor or other care provider to review them with you.        Past Medical History:   Diagnosis Date   • Anxiety and depression    • Asthma    • COPD (chronic obstructive pulmonary disease)    • Hypertension    • TMJ (dislocation of temporomandibular joint)        Allergies   Allergen Reactions   • Amoxicillin    • Penicillins        Past Surgical History:   Procedure Laterality Date   • EAR TUBES     • TUBAL ABDOMINAL LIGATION         Family History   Problem Relation Age of Onset   • Hypertension Father    • Alzheimer's disease Paternal Grandmother    • Emphysema Paternal Grandfather        Social History     Social History   • Marital status:       Spouse name: N/A   • Number of children: N/A   • Years of education: N/A     Social History Main Topics   • Smoking status: Heavy Tobacco Smoker     Packs/day: 1.00   • Smokeless tobacco: Not on file   • Alcohol use Yes      Comment: occ   • Drug use: No   • Sexual activity: Yes      Comment: e-sure     Other Topics Concern   • Not on file     Social History Narrative           Objective   Physical Exam   Constitutional: She is oriented to person, place, and time. She appears well-developed and well-nourished. No distress.   ED Triage Vitals:  Temp: 98.2 °F (36.8 °C) (02/03/18 1927)  Heart Rate: 70 (02/03/18 1927)  Resp: 20 (02/03/18 1927)  BP: 117/73 (02/03/18 1927)  SpO2: 98 % (02/03/18 1927)  Temp src: Oral (02/03/18 1927)  Heart Rate Source: n/a  Patient Position: n/a  BP Location: Right arm (02/03/18 1927)  FiO2 (%): n/a    The patient's vitals were reviewed by me.  Unless otherwise noted they are within normal limits.     HENT:   Head: Normocephalic and atraumatic.   Right Ear: External ear normal.   Left Ear: External ear normal.   Nose: Nose normal.   Mouth/Throat: Oropharynx is clear and moist.   Eyes: Conjunctivae and EOM are normal. Pupils are equal, round, and reactive to light. Right eye exhibits no discharge. Left eye exhibits no discharge.   Neck: Normal range of motion. Neck supple. No JVD present. No tracheal deviation present. No thyromegaly present.   No meningeal signs   Cardiovascular: Normal rate, regular rhythm, normal heart sounds and intact distal pulses.  Exam reveals no gallop and no friction rub.    No murmur heard.  Pulmonary/Chest: Effort normal and breath sounds normal. No stridor. No respiratory distress. She has no wheezes. She has no rales. She exhibits no tenderness.   Abdominal: Soft. Bowel sounds are normal. She exhibits no distension and no mass. There is no tenderness. There is no rebound and no guarding. No hernia.   Musculoskeletal: Normal range of motion. She exhibits no  edema or deformity.   Lymphadenopathy:     She has no cervical adenopathy.   Neurological: She is alert and oriented to person, place, and time. No cranial nerve deficit. She exhibits normal muscle tone. Coordination normal.   Skin: Skin is warm and dry. No rash noted. She is not diaphoretic. No erythema. No pallor.   Psychiatric: Her behavior is normal.   Nursing note and vitals reviewed.      Procedures         ED Course  ED Course   Comment By Time   The laboratory values were reviewed by me.  The urine microscopic shows 3-5 day BCs, 1+ bacteria, 7-12 squamous epithelial cells.  The sodium is 135.  Testing is 2.9.  The chloride is 96.  The CO2 is 29.9.  The calciums 8.1.  The total bilirubin is less than 0.2.  The laboratory values are otherwise unremarkable. Luc Johnson MD 02/03 2050    9:11 PM, 02/03/18:  The patient reassessed.  She tolerated clear liquids.  Her vital signs were reviewed and are stable.  Abdominal exam: Soft nontender no masses positive bowel sounds.    9:12 PM, 02/03/18:  The Jordy report was reviewed by me.  The report number is [id number].    9:12 PM, 02/03/18:  The patient's diagnosis of pneumonia, and hypokalemia was discussed with her.  Given potassium here in emergency department and a prescription for potassium.  The patient should drink clear liquids for 24-48 hours and advance her diet as tolerated.  Will write her prescription for Hycodan.  She should follow-up with Dr. Larsen on Monday as scheduled.  He should return to emergency department if she has vomiting not controlled with the Zofran, vomiting blood, bloody diarrhea, worse in any way at all.  All the patient's questions were answered she will be discharged in good condition.              MDM  XR Chest 2 View    (Results Pending)     Labs Reviewed   INFLUENZA ANTIGEN, RAPID   COMPREHENSIVE METABOLIC PANEL   URINALYSIS W/ CULTURE IF INDICATED   CBC WITH AUTO DIFFERENTIAL   CBC AND DIFFERENTIAL    Narrative:     The following  orders were created for panel order CBC & Differential.  Procedure                               Abnormality         Status                     ---------                               -----------         ------                     CBC Auto Differential[[id number]]                                                         Please view results for these tests on the individual orders.     Xr Chest 2 View    Result Date: 1/28/2018  Narrative: INDICATION: Cough and congestion for the past week  TECHNIQUE: 2 views the chest were obtained and compared with 05/19/2016  FINDINGS: Bony structures are unremarkable. The heart and mediastinum have a normal configuration. There is a 1 cm nodule at the right costophrenic angle that is indeterminate. There is a questionable a patchy infiltrate at the left lung base is well. Both findings are nonspecific and could reflect either inflammatory disease fibrosis or even early malignancy for the right nodule. The lung bases were clear on the previous examination. Recommend follow-up chest radiography to document clearing at both lung bases. If the abnormalities failed to clear CT of the chest would be useful for further evaluation.      Impression: 1 cm indeterminate nodule right lung base. Probable small patchy infiltrate at the left base. Pneumonia or pneumonitis is suspected. The right base nodule is indeterminate. Recommend radiographic follow-up to document clearing. Review of ED records suggests that the above left base finding may not been noted at the time of care. A finalized copy of this report was sent to the ED at 7:30 AM on 01/28/2018 with telephone notification and documentation.  This report was finalized on 1/28/2018 7:35 AM by Dr. Armani Britton MD.      Ct Head Without Contrast    Result Date: 1/28/2018  Narrative: HEAD CT WITHOUT CONTRAST  HISTORY: Trauma to the 4 head one month ago with intermittent headaches blurred vision and dizziness since. Congestion for the past  week.  TECHNIQUE: Axial images were obtained without contrast. No previous CTs or nuclear cardiac studies over the past year. Radiation dose reduction techniques were utilized, including automated exposure control and exposure modulation based on body size.  FINDINGS: Brain images are normal with no evidence of mass lesion hemorrhage or edema. No midline shift is noted. Extra-axial structures are remarkable for mucosal thickening in the paranasal sinuses with bilateral maxillary sinus air-fluid levels.      Impression: Negative brain CT. Mixed chronic and acute pansinusitis  This report was finalized on 1/28/2018 7:29 AM by Dr. Armani Britton MD.        Final diagnoses:   None         ED Medications:  Medications - No data to display    New Medications:     Medication List      ASK your doctor about these medications          albuterol 108 (90 Base) MCG/ACT inhaler   Commonly known as:  PROVENTIL HFA;VENTOLIN HFA   Inhale 2 puffs Every 4 (Four) Hours As Needed for Wheezing.       * azithromycin 250 MG tablet   Commonly known as:  ZITHROMAX   Take 2 tablets the first day, then 1 tablet daily for 4 days.       * azithromycin 250 MG tablet   Commonly known as:  ZITHROMAX Z-MATTHEW   Take 2 tablets the first day, then 1 tablet daily for 4 days.       benzonatate 100 MG capsule   Commonly known as:  TESSALON   Take 1 capsule by mouth 3 (Three) Times a Day As Needed for Cough for up   to 7 days.       budesonide-formoterol 160-4.5 MCG/ACT inhaler   Commonly known as:  SYMBICORT   Inhale 2 puffs 2 (Two) Times a Day.       cetirizine 10 MG tablet   Commonly known as:  zyrTEC       fluticasone 50 MCG/BLIST diskus inhaler   Commonly known as:  FLOVENT DISKUS       HYDROcodone-homatropine 5-1.5 MG/5ML syrup   Commonly known as:  HYCODAN   Take 5 mL by mouth every 4 (four) hours as needed for cough.       * ibuprofen 600 MG tablet   Commonly known as:  ADVIL,MOTRIN       * ibuprofen 100 MG/5ML suspension   Commonly known as:  CHILD  IBUPROFEN   Take 20 mL by mouth every 6 (six) hours as needed for mild pain (1-3) or   fever.       lisinopril 10 MG tablet   Commonly known as:  PRINIVIL,ZESTRIL       MethylPREDNISolone 4 MG tablet   Commonly known as:  MEDROL (MATTHEW)   Take as directed on package instructions.       NYQUIL PO       Omeprazole 20 MG tablet delayed-release       * ondansetron ODT 4 MG disintegrating tablet   Commonly known as:  ZOFRAN ODT   Take 1 tablet by mouth Every 6 (Six) Hours As Needed for nausea or   vomiting for up to 20 doses.       * ondansetron ODT 4 MG disintegrating tablet   Commonly known as:  ZOFRAN-ODT   Take 1 tablet by mouth 4 (Four) Times a Day As Needed for Nausea or   Vomiting.       phenylephrine 1 % nasal spray   Commonly known as:  SUPA-SYNEPHRINE   2 drops into each nostril every 6 (six) hours as needed for congestion.       promethazine 25 MG tablet   Commonly known as:  PHENERGAN       psyllium 58.6 % powder   Commonly known as:  METAMUCIL       sertraline 50 MG tablet   Commonly known as:  ZOLOFT       sulfamethoxazole-trimethoprim 800-160 MG per tablet   Commonly known as:  BACTRIM DS,SEPTRA DS   Take one tablet by mouth twice daily for 7 days       * Notice:  This list has 6 medication(s) that are the same as other   medications prescribed for you. Read the directions carefully, and ask   your doctor or other care provider to review them with you.        Stopped Medications:     Medication List      ASK your doctor about these medications          albuterol 108 (90 Base) MCG/ACT inhaler   Commonly known as:  PROVENTIL HFA;VENTOLIN HFA   Inhale 2 puffs Every 4 (Four) Hours As Needed for Wheezing.       * azithromycin 250 MG tablet   Commonly known as:  ZITHROMAX   Take 2 tablets the first day, then 1 tablet daily for 4 days.       * azithromycin 250 MG tablet   Commonly known as:  ZITHROMAX Z-MATTHEW   Take 2 tablets the first day, then 1 tablet daily for 4 days.       benzonatate 100 MG capsule   Commonly  known as:  TESSALON   Take 1 capsule by mouth 3 (Three) Times a Day As Needed for Cough for up   to 7 days.       budesonide-formoterol 160-4.5 MCG/ACT inhaler   Commonly known as:  SYMBICORT   Inhale 2 puffs 2 (Two) Times a Day.       cetirizine 10 MG tablet   Commonly known as:  zyrTEC       fluticasone 50 MCG/BLIST diskus inhaler   Commonly known as:  FLOVENT DISKUS       HYDROcodone-homatropine 5-1.5 MG/5ML syrup   Commonly known as:  HYCODAN   Take 5 mL by mouth every 4 (four) hours as needed for cough.       * ibuprofen 600 MG tablet   Commonly known as:  ADVIL,MOTRIN       * ibuprofen 100 MG/5ML suspension   Commonly known as:  CHILD IBUPROFEN   Take 20 mL by mouth every 6 (six) hours as needed for mild pain (1-3) or   fever.       lisinopril 10 MG tablet   Commonly known as:  PRINIVIL,ZESTRIL       MethylPREDNISolone 4 MG tablet   Commonly known as:  MEDROL (MATTHEW)   Take as directed on package instructions.       NYQUIL PO       Omeprazole 20 MG tablet delayed-release       * ondansetron ODT 4 MG disintegrating tablet   Commonly known as:  ZOFRAN ODT   Take 1 tablet by mouth Every 6 (Six) Hours As Needed for nausea or   vomiting for up to 20 doses.       * ondansetron ODT 4 MG disintegrating tablet   Commonly known as:  ZOFRAN-ODT   Take 1 tablet by mouth 4 (Four) Times a Day As Needed for Nausea or   Vomiting.       phenylephrine 1 % nasal spray   Commonly known as:  SUPA-SYNEPHRINE   2 drops into each nostril every 6 (six) hours as needed for congestion.       promethazine 25 MG tablet   Commonly known as:  PHENERGAN       psyllium 58.6 % powder   Commonly known as:  METAMUCIL       sertraline 50 MG tablet   Commonly known as:  ZOLOFT       sulfamethoxazole-trimethoprim 800-160 MG per tablet   Commonly known as:  BACTRIM DS,SEPTRA DS   Take one tablet by mouth twice daily for 7 days       * Notice:  This list has 6 medication(s) that are the same as other   medications prescribed for you. Read the directions  carefully, and ask   your doctor or other care provider to review them with you.          Final diagnoses:   Non-intractable vomiting with nausea, unspecified vomiting type   Pneumonia of right lower lobe due to infectious organism   Acute recurrent pansinusitis            Luc Johnson MD  02/03/18 2109       Luc Johnson MD  02/03/18 2121

## 2018-02-05 LAB — BACTERIA SPEC AEROBE CULT: NO GROWTH

## 2018-05-16 ENCOUNTER — HOSPITAL ENCOUNTER (EMERGENCY)
Facility: HOSPITAL | Age: 43
Discharge: HOME | End: 2018-05-16
Attending: EMERGENCY MEDICINE | Admitting: EMERGENCY MEDICINE
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
## 2018-05-16 PROCEDURE — 99282 EMERGENCY DEPT VISIT SF MDM: CPT | Performed by: EMERGENCY MEDICINE

## 2018-05-16 PROCEDURE — 99283 EMERGENCY DEPT VISIT LOW MDM: CPT

## 2018-05-16 RX ORDER — DOXYCYCLINE 100 MG/1
100 CAPSULE ORAL 2 TIMES DAILY
Qty: 20 CAPSULE | Refills: 0 | Status: SHIPPED | OUTPATIENT
Start: 2018-05-16 | End: 2019-04-16

## 2018-05-16 RX ORDER — DOXYCYCLINE HYCLATE 100 MG
TABLET ORAL
Status: DISCONTINUED
Start: 2018-05-16 | End: 2018-05-16 | Stop reason: HOSPADM

## 2018-05-16 RX ORDER — ONDANSETRON 4 MG/1
8 TABLET, ORALLY DISINTEGRATING ORAL ONCE
Status: DISCONTINUED | OUTPATIENT
Start: 2018-05-16 | End: 2018-05-16 | Stop reason: HOSPADM

## 2018-05-16 RX ORDER — ONDANSETRON 4 MG/1
TABLET, ORALLY DISINTEGRATING ORAL
Status: DISCONTINUED
Start: 2018-05-16 | End: 2018-05-16 | Stop reason: HOSPADM

## 2018-05-16 RX ORDER — DOXYCYCLINE 100 MG/1
100 CAPSULE ORAL ONCE
Status: DISCONTINUED | OUTPATIENT
Start: 2018-05-16 | End: 2018-05-16 | Stop reason: HOSPADM

## 2018-05-16 RX ORDER — ONDANSETRON 8 MG/1
8 TABLET, ORALLY DISINTEGRATING ORAL EVERY 8 HOURS PRN
Qty: 10 TABLET | Refills: 0 | Status: SHIPPED | OUTPATIENT
Start: 2018-05-16 | End: 2019-04-16

## 2018-05-16 NOTE — DISCHARGE INSTRUCTIONS
Follow-up with Dr. Larsen on Monday.  Return to emerge department if there is fever, chills, headache, worsening redness, worse in any way at all.

## 2018-05-16 NOTE — ED PROVIDER NOTES
Subjective   History of Present Illness  History of Present Illness    Chief complaint: Tick bite    Location: Right flank    Quality/Severity:  Mild erythema    Timing/Onset/Duration: For 3 days    Modifying Factors: Nothing makes it better or worse    Associated Symptoms: No headache.  No fever chills or cough.  No sore throat earache or nasal congestion.  No chest pain or shortness of breath.  No abdominal pain.  No nausea or vomiting.  No diarrhea or burning when she urinates.    Narrative: This 43-year-old white female noted that she had a tick on her 3 days ago.  The family removed the tick tonight.  The patient denies any fever or chills.  She did note a red spot on her right flank.  The patient has no other complaints.    PCP:  No Known Provider      Review of Systems   Constitutional: Negative for chills and fever.   HENT: Negative for ear pain and sore throat.    Eyes: Negative for discharge and redness.   Respiratory: Negative for cough, chest tightness and shortness of breath.    Cardiovascular: Negative for chest pain, palpitations and leg swelling.   Gastrointestinal: Negative for abdominal pain, blood in stool, constipation, diarrhea, nausea and vomiting.   Genitourinary: Negative for difficulty urinating.   Musculoskeletal: Negative for back pain.   Skin: Negative for rash.   Neurological: Negative for headaches.   Hematological: Negative for adenopathy.   Psychiatric/Behavioral: Negative.  Negative for confusion.        Medication List      START taking these medications    doxycycline 100 MG capsule  Commonly known as:  MONODOX  Take 1 capsule by mouth 2 (Two) Times a Day.        CHANGE how you take these medications    ondansetron ODT 8 MG disintegrating tablet  Commonly known as:  ZOFRAN ODT  Take 1 tablet by mouth Every 8 (Eight) Hours As Needed for Nausea or   Vomiting.  What changed:  medication strength  how much to take  when to take this  Another medication with the same name was removed.  Continue taking this   medication, and follow the directions you see here.        CONTINUE taking these medications    albuterol 108 (90 Base) MCG/ACT inhaler  Commonly known as:  PROVENTIL HFA;VENTOLIN HFA  Inhale 2 puffs Every 4 (Four) Hours As Needed for Wheezing.     budesonide-formoterol 160-4.5 MCG/ACT inhaler  Commonly known as:  SYMBICORT  Inhale 2 puffs 2 (Two) Times a Day.     cetirizine 10 MG tablet  Commonly known as:  zyrTEC     fluticasone 50 MCG/BLIST diskus inhaler  Commonly known as:  FLOVENT DISKUS     HYDROcodone-homatropine 5-1.5 MG/5ML syrup  Commonly known as:  HYCODAN  Take 5 mL by mouth every 4 (four) hours as needed for cough.     * ibuprofen 600 MG tablet  Commonly known as:  ADVIL,MOTRIN     * ibuprofen 100 MG/5ML suspension  Commonly known as:  CHILD IBUPROFEN  Take 20 mL by mouth every 6 (six) hours as needed for mild pain (1-3) or   fever.     lisinopril 10 MG tablet  Commonly known as:  PRINIVIL,ZESTRIL     MethylPREDNISolone 4 MG tablet  Commonly known as:  MEDROL (MATTHEW)  Take as directed on package instructions.     NYQUIL PO     Omeprazole 20 MG tablet delayed-release     phenylephrine 1 % nasal spray  Commonly known as:  SUPA-SYNEPHRINE  2 drops into each nostril every 6 (six) hours as needed for congestion.     potassium chloride 10 MEQ CR tablet  Commonly known as:  K-DUR  Take 1 tablet by mouth 2 (Two) Times a Day.     promethazine 25 MG tablet  Commonly known as:  PHENERGAN     psyllium 58.6 % powder  Commonly known as:  METAMUCIL     sertraline 50 MG tablet  Commonly known as:  ZOLOFT        * This list has 2 medication(s) that are the same as other medications   prescribed for you. Read the directions carefully, and ask your doctor or   other care provider to review them with you.            STOP taking these medications    azithromycin 250 MG tablet  Commonly known as:  ZITHROMAX Z-MATTHEW     cephalexin 500 MG capsule  Commonly known as:  KEFLEX     sulfamethoxazole-trimethoprim  800-160 MG per tablet  Commonly known as:  BACTRIM DS,SEPTRA DS          Past Medical History:   Diagnosis Date   • Anxiety and depression    • Asthma    • COPD (chronic obstructive pulmonary disease)    • Hypertension    • TMJ (dislocation of temporomandibular joint)        Allergies   Allergen Reactions   • Amoxicillin    • Penicillins        Past Surgical History:   Procedure Laterality Date   • EAR TUBES     • TUBAL ABDOMINAL LIGATION         Family History   Problem Relation Age of Onset   • Hypertension Father    • Alzheimer's disease Paternal Grandmother    • Emphysema Paternal Grandfather        Social History     Social History   • Marital status:      Social History Main Topics   • Smoking status: Heavy Tobacco Smoker     Packs/day: 1.00   • Alcohol use Yes      Comment: occ   • Drug use: No   • Sexual activity: Yes      Comment: e-sure     Other Topics Concern   • Not on file           Objective   Physical Exam   Constitutional: She is oriented to person, place, and time. She appears well-developed and well-nourished. No distress.   ED Triage Vitals  Temp: n/a  Pulse: n/a  Resp: n/a  BP: n/a  SpO2: n/a  Temp src: n/a  Heart Rate Source: n/a  Patient Position: n/a  BP Location: n/a  FiO2 (%): n/a    The patient's vitals were reviewed by me.  Unless otherwise noted they are within normal limits.     HENT:   Head: Normocephalic and atraumatic.   Right Ear: External ear normal.   Left Ear: External ear normal.   Nose: Nose normal.   Mouth/Throat: Oropharynx is clear and moist.   Eyes: Conjunctivae and EOM are normal. Pupils are equal, round, and reactive to light. Right eye exhibits no discharge. Left eye exhibits no discharge. No scleral icterus.   Neck: Normal range of motion. Neck supple. No JVD present. No tracheal deviation present. No thyromegaly present.   Cardiovascular: Normal rate, regular rhythm, normal heart sounds and intact distal pulses.  Exam reveals no gallop and no friction rub.     No murmur heard.  Pulmonary/Chest: Effort normal and breath sounds normal. No stridor. No respiratory distress. She has no wheezes. She has no rales. She exhibits no tenderness.   Abdominal: Soft. Bowel sounds are normal. She exhibits no distension and no mass. There is no tenderness. There is no rebound and no guarding. No hernia.   Musculoskeletal: Normal range of motion. She exhibits no edema or deformity.   Lymphadenopathy:     She has no cervical adenopathy.   Neurological: She is alert and oriented to person, place, and time.   Skin: Skin is warm and dry. Capillary refill takes less than 2 seconds. No rash noted. She is not diaphoretic. There is erythema. No pallor.   There is an indurated, erythematous, mildly tender area approximately 1 mm in diameter.  This is noted on the right flank.  There are no tick parts noted.  There is no fluctuance or purulent drainage.  There is no purpura or petechia.  There is no other rashes noted.   Psychiatric: Her behavior is normal.   Nursing note and vitals reviewed.      Procedures           ED Course    the patient's diagnosis of tick bite was discussed with her.  He will be placed on doxycycline.  She should have the area rechecked next week.  She should return to emergency department if there is worsening rash, fever, chills, headache, worse in any way at all.  All the patient's questions were answered she will be discharged in good condition.          MDM    No orders to display     Labs Reviewed - No data to display  No results found.    Final diagnoses:   Tick bite, initial encounter         ED Medications:  Medications   doxycycline (MONODOX) capsule 100 mg (not administered)   ondansetron ODT (ZOFRAN-ODT) disintegrating tablet 8 mg (not administered)   ondansetron ODT (ZOFRAN-ODT) 4 MG disintegrating tablet  - ADS Override Pull (not administered)   doxycycline (VIBRAMYICN) 100 MG tablet  - ADS Override Pull (not administered)       New Medications:      Medication List      START taking these medications    doxycycline 100 MG capsule  Commonly known as:  MONODOX  Take 1 capsule by mouth 2 (Two) Times a Day.        CHANGE how you take these medications    ondansetron ODT 8 MG disintegrating tablet  Commonly known as:  ZOFRAN ODT  Take 1 tablet by mouth Every 8 (Eight) Hours As Needed for Nausea or   Vomiting.  What changed:  medication strength  how much to take  when to take this  Another medication with the same name was removed. Continue taking this   medication, and follow the directions you see here.        CONTINUE taking these medications    albuterol 108 (90 Base) MCG/ACT inhaler  Commonly known as:  PROVENTIL HFA;VENTOLIN HFA  Inhale 2 puffs Every 4 (Four) Hours As Needed for Wheezing.     budesonide-formoterol 160-4.5 MCG/ACT inhaler  Commonly known as:  SYMBICORT  Inhale 2 puffs 2 (Two) Times a Day.     cetirizine 10 MG tablet  Commonly known as:  zyrTEC     fluticasone 50 MCG/BLIST diskus inhaler  Commonly known as:  FLOVENT DISKUS     HYDROcodone-homatropine 5-1.5 MG/5ML syrup  Commonly known as:  HYCODAN  Take 5 mL by mouth every 4 (four) hours as needed for cough.     * ibuprofen 600 MG tablet  Commonly known as:  ADVIL,MOTRIN     * ibuprofen 100 MG/5ML suspension  Commonly known as:  CHILD IBUPROFEN  Take 20 mL by mouth every 6 (six) hours as needed for mild pain (1-3) or   fever.     lisinopril 10 MG tablet  Commonly known as:  PRINIVIL,ZESTRIL     MethylPREDNISolone 4 MG tablet  Commonly known as:  MEDROL (MATTHEW)  Take as directed on package instructions.     NYQUIL PO     Omeprazole 20 MG tablet delayed-release     phenylephrine 1 % nasal spray  Commonly known as:  SUPA-SYNEPHRINE  2 drops into each nostril every 6 (six) hours as needed for congestion.     potassium chloride 10 MEQ CR tablet  Commonly known as:  K-DUR  Take 1 tablet by mouth 2 (Two) Times a Day.     promethazine 25 MG tablet  Commonly known as:  PHENERGAN     psyllium 58.6 %  powder  Commonly known as:  METAMUCIL     sertraline 50 MG tablet  Commonly known as:  ZOLOFT        * This list has 2 medication(s) that are the same as other medications   prescribed for you. Read the directions carefully, and ask your doctor or   other care provider to review them with you.            STOP taking these medications    azithromycin 250 MG tablet  Commonly known as:  ZITHROMAX Z-MATTHEW     cephalexin 500 MG capsule  Commonly known as:  KEFLEX     sulfamethoxazole-trimethoprim 800-160 MG per tablet  Commonly known as:  BACTRIM DS,SEPTRA DS          Stopped Medications:     Medication List      START taking these medications    doxycycline 100 MG capsule  Commonly known as:  MONODOX  Take 1 capsule by mouth 2 (Two) Times a Day.        CHANGE how you take these medications    ondansetron ODT 8 MG disintegrating tablet  Commonly known as:  ZOFRAN ODT  Take 1 tablet by mouth Every 8 (Eight) Hours As Needed for Nausea or   Vomiting.  What changed:  medication strength  how much to take  when to take this  Another medication with the same name was removed. Continue taking this   medication, and follow the directions you see here.        CONTINUE taking these medications    albuterol 108 (90 Base) MCG/ACT inhaler  Commonly known as:  PROVENTIL HFA;VENTOLIN HFA  Inhale 2 puffs Every 4 (Four) Hours As Needed for Wheezing.     budesonide-formoterol 160-4.5 MCG/ACT inhaler  Commonly known as:  SYMBICORT  Inhale 2 puffs 2 (Two) Times a Day.     cetirizine 10 MG tablet  Commonly known as:  zyrTEC     fluticasone 50 MCG/BLIST diskus inhaler  Commonly known as:  FLOVENT DISKUS     HYDROcodone-homatropine 5-1.5 MG/5ML syrup  Commonly known as:  HYCODAN  Take 5 mL by mouth every 4 (four) hours as needed for cough.     * ibuprofen 600 MG tablet  Commonly known as:  ADVIL,MOTRIN     * ibuprofen 100 MG/5ML suspension  Commonly known as:  CHILD IBUPROFEN  Take 20 mL by mouth every 6 (six) hours as needed for mild pain (1-3)  or   fever.     lisinopril 10 MG tablet  Commonly known as:  PRINIVIL,ZESTRIL     MethylPREDNISolone 4 MG tablet  Commonly known as:  MEDROL (MATTHEW)  Take as directed on package instructions.     NYQUIL PO     Omeprazole 20 MG tablet delayed-release     phenylephrine 1 % nasal spray  Commonly known as:  SUPA-SYNEPHRINE  2 drops into each nostril every 6 (six) hours as needed for congestion.     potassium chloride 10 MEQ CR tablet  Commonly known as:  K-DUR  Take 1 tablet by mouth 2 (Two) Times a Day.     promethazine 25 MG tablet  Commonly known as:  PHENERGAN     psyllium 58.6 % powder  Commonly known as:  METAMUCIL     sertraline 50 MG tablet  Commonly known as:  ZOLOFT        * This list has 2 medication(s) that are the same as other medications   prescribed for you. Read the directions carefully, and ask your doctor or   other care provider to review them with you.            STOP taking these medications    azithromycin 250 MG tablet  Commonly known as:  ZITHROMAX Z-MATTHEW     cephalexin 500 MG capsule  Commonly known as:  KEFLEX     sulfamethoxazole-trimethoprim 800-160 MG per tablet  Commonly known as:  BACTRIM DS,SEPTRA DS            Final diagnoses:   Tick bite, initial encounter            Luc Johnson MD  05/16/18 [id number]

## 2018-09-11 ENCOUNTER — TELEPHONE (OUTPATIENT)
Dept: SURGERY | Facility: CLINIC | Age: 43
End: 2018-09-11

## 2019-01-15 ENCOUNTER — TELEPHONE (OUTPATIENT)
Dept: SURGERY | Facility: CLINIC | Age: 44
End: 2019-01-15

## 2019-01-15 NOTE — TELEPHONE ENCOUNTER
I called Cara @ 3:27pm to confirm her 1/16/19 appointment with Dr. Burnett.  Patient stated she has moved to [place] and needs to be taken off our schedule.

## 2019-04-10 ENCOUNTER — OFFICE VISIT (OUTPATIENT)
Dept: SURGERY | Facility: CLINIC | Age: 44
End: 2019-04-10

## 2019-04-10 VITALS
WEIGHT: 136 LBS | RESPIRATION RATE: 16 BRPM | HEART RATE: 72 BPM | SYSTOLIC BLOOD PRESSURE: 116 MMHG | HEIGHT: 62 IN | DIASTOLIC BLOOD PRESSURE: 74 MMHG | BODY MASS INDEX: 25.03 KG/M2

## 2019-04-10 DIAGNOSIS — L98.8 DYSPLASTIC SKIN LESION: Primary | ICD-10-CM

## 2019-04-10 PROCEDURE — 99202 OFFICE O/P NEW SF 15 MIN: CPT | Performed by: SURGERY

## 2019-04-10 NOTE — PROGRESS NOTES
Cara Ricketts 43 y.o. female presents @ the req of Dr. Wood for eval of skin lesion back X sev years. Pt reports increase in size, + bleeding, and + pain.  Pt desires excision. Pt also reports her hair get caught on it and pulls.   Chief Complaint   Patient presents with   • Skin Lesion     back             HPI     Above-noted agree.  Cara has a skin lesion that has started to grow.  It also bleeds.  It is located on her back.  It has started to change color as well.  She has no other symptoms.  She is tolerating a regular diet without nausea vomiting.  She is moving her bowels well.  She has no other complaints.    Review of Systems   All other systems reviewed and are negative.            Current Outpatient Medications:   •  albuterol (PROVENTIL HFA;VENTOLIN HFA) 108 (90 Base) MCG/ACT inhaler, Inhale 2 puffs Every 4 (Four) Hours As Needed for Wheezing., Disp: 1 inhaler, Rfl: 0  •  budesonide-formoterol (SYMBICORT) 160-4.5 MCG/ACT inhaler, Inhale 2 puffs 2 (Two) Times a Day., Disp: 1 inhaler, Rfl: 0  •  cetirizine (ZyrTEC) 10 MG tablet, Take 10 mg by mouth daily., Disp: , Rfl:   •  doxycycline (MONODOX) 100 MG capsule, Take 1 capsule by mouth 2 (Two) Times a Day., Disp: 20 capsule, Rfl: 0  •  fluticasone (FLOVENT DISKUS) 50 MCG/BLIST diskus inhaler, Inhale 1 puff 2 (two) times a day., Disp: , Rfl:   •  HYDROcodone-homatropine (HYCODAN) 5-1.5 MG/5ML syrup, Take 5 mL by mouth every 4 (four) hours as needed for cough., Disp: 120 mL, Rfl: 0  •  ibuprofen (ADVIL,MOTRIN) 600 MG tablet, Take 600 mg by mouth Every 6 (Six) Hours As Needed for mild pain (1-3)., Disp: , Rfl:   •  ibuprofen (CHILD IBUPROFEN) 100 MG/5ML suspension, Take 20 mL by mouth every 6 (six) hours as needed for mild pain (1-3) or fever., Disp: 473 mL, Rfl: 0  •  lisinopril (PRINIVIL,ZESTRIL) 10 MG tablet, Take 10 mg by mouth daily., Disp: , Rfl:   •  MethylPREDNISolone (MEDROL, MATTHEW,) 4 MG tablet, Take as directed on package instructions., Disp:  21 tablet, Rfl: 0  •  Omeprazole 20 MG tablet delayed-release, Take 20 mg by mouth Daily., Disp: , Rfl:   •  ondansetron ODT (ZOFRAN ODT) 8 MG disintegrating tablet, Take 1 tablet by mouth Every 8 (Eight) Hours As Needed for Nausea or Vomiting., Disp: 10 tablet, Rfl: 0  •  phenylephrine (SUPA-SYNEPHRINE) 1 % nasal spray, 2 drops into each nostril every 6 (six) hours as needed for congestion., Disp: 29.6 mL, Rfl: 0  •  potassium chloride (K-DUR) 10 MEQ CR tablet, Take 1 tablet by mouth 2 (Two) Times a Day., Disp: 20 tablet, Rfl: 0  •  promethazine (PHENERGAN) 25 MG tablet, Take 25 mg by mouth every 6 (six) hours as needed for nausea or vomiting., Disp: , Rfl:   •  Pseudoeph-Doxylamine-DM-APAP (NYQUIL PO), Take 4 teaspoon(s) by mouth Every Night., Disp: , Rfl:   •  psyllium (METAMUCIL) 58.6 % powder, Take by mouth 3 (three) times a day., Disp: , Rfl:   •  sertraline (ZOLOFT) 50 MG tablet, Take 50 mg by mouth daily., Disp: , Rfl:         Allergies   Allergen Reactions   • Amoxicillin    • Penicillins            Past Medical History:   Diagnosis Date   • Anxiety and depression    • Asthma    • COPD (chronic obstructive pulmonary disease) (CMS/[facility])    • Hypertension    • TMJ (dislocation of temporomandibular joint)            Past Surgical History:   Procedure Laterality Date   • EAR TUBES     • TUBAL ABDOMINAL LIGATION             Social History     Tobacco Use   • Smoking status: Heavy Tobacco Smoker     Packs/day: 1.00   • Smokeless tobacco: Never Used   Substance Use Topics   • Alcohol use: Yes     Comment: occ   • Drug use: No             There is no immunization history on file for this patient.        Physical Exam   Constitutional: She is oriented to person, place, and time. She appears well-developed and well-nourished.   HENT:   Head: Normocephalic and atraumatic.   Right Ear: External ear normal.   Left Ear: External ear normal.   Musculoskeletal: She exhibits no edema or deformity.   Neurological: She is alert  "and oriented to person, place, and time.   Skin:   On her back is a 1 cm sized skin lesion that is raised above the skin and has an area of necrosis on it   Psychiatric: She has a normal mood and affect. Her behavior is normal.   Nursing note and vitals reviewed.      Debilities/Disabilities Identified: None    Emotional Behavior: Appropriate      /74   Pulse 72   Resp 16   Ht 157 cm (61.81\")   Wt 61.7 kg (136 lb)   BMI 25.03 kg/m²         Cara was seen today for skin lesion.    Diagnoses and all orders for this visit:    Dysplastic skin lesion    Once insurance gives us permission to excise the lesion we will bring her back and perform an excision here in the office.    Thank you for allowing me to participate in the care of this interesting patient.        "

## 2019-04-18 ENCOUNTER — TELEPHONE (OUTPATIENT)
Dept: SURGERY | Facility: CLINIC | Age: 44
End: 2019-04-18

## 2019-05-06 ENCOUNTER — PROCEDURE VISIT (OUTPATIENT)
Dept: SURGERY | Facility: CLINIC | Age: 44
End: 2019-05-06

## 2019-05-06 VITALS
HEART RATE: 72 BPM | WEIGHT: 136 LBS | DIASTOLIC BLOOD PRESSURE: 82 MMHG | BODY MASS INDEX: 25.03 KG/M2 | SYSTOLIC BLOOD PRESSURE: 120 MMHG | RESPIRATION RATE: 16 BRPM | HEIGHT: 62 IN

## 2019-05-06 DIAGNOSIS — L98.8 DYSPLASTIC SKIN LESION: Primary | ICD-10-CM

## 2019-05-06 PROCEDURE — 11401 EXC TR-EXT B9+MARG 0.6-1 CM: CPT | Performed by: SURGERY

## 2019-05-06 NOTE — PROGRESS NOTES
Cara Ricketts 43 y.o. female presents for excision mole LEFT back.  PCP Dr. Wood.  Chief Complaint   Patient presents with   • Suspicious Skin Lesion     back             HPI         Review of Systems          Current Outpatient Medications:   •  albuterol (PROVENTIL HFA;VENTOLIN HFA) 108 (90 Base) MCG/ACT inhaler, Inhale 2 puffs Every 4 (Four) Hours As Needed for Wheezing., Disp: 1 inhaler, Rfl: 0  •  Glycopyrrolate-Formoterol (BEVESPI AEROSPHERE IN), Inhale., Disp: , Rfl:   •  lisinopril-hydrochlorothiazide (PRINZIDE,ZESTORETIC) 10-12.5 MG per tablet, Take 1 tablet by mouth Daily., Disp: , Rfl:   •  Loratadine 10 MG capsule, Take  by mouth., Disp: , Rfl:         Allergies   Allergen Reactions   • Amoxicillin    • Penicillins            Past Medical History:   Diagnosis Date   • Anxiety and depression    • Asthma    • COPD (chronic obstructive pulmonary disease) (CMS/[facility])    • Hypertension    • TMJ (dislocation of temporomandibular joint)            Past Surgical History:   Procedure Laterality Date   • EAR TUBES     • TUBAL ABDOMINAL LIGATION             Social History     Tobacco Use   • Smoking status: Heavy Tobacco Smoker     Packs/day: 1.00   • Smokeless tobacco: Never Used   Substance Use Topics   • Alcohol use: Yes     Comment: occ   • Drug use: No             There is no immunization history on file for this patient.        Physical Exam     I discussed with the patient the benefits and risks of excising this lesion.   Risks not limited to but including bleeding, infection, having to excise more if the lesion turns out to be cancer.  The patient appeared to understand and signed informed consent.  The area was prepped and draped in the usual sterile fashion.  Local was injected into the area to be excised.  The lesion was then excised.  Hemostasis was perfected.  The wound was then closed using simple sutures.  Sterile dressings were applied.  The patient tolerated the procedure well without  "complication.  Wound care instructions were then given to the patient.  The lesion measured 1 cm x 1 cm x 1 cm was located on her back    Debilities/Disabilities Identified: None    Emotional Behavior: Appropriate      /82   Pulse 72   Resp 16   Ht 157 cm (61.81\")   Wt 61.7 kg (136 lb)   BMI 25.03 kg/m²         Cara was seen today for suspicious skin lesion.    Diagnoses and all orders for this visit:    Dysplastic skin lesion    Thank you for allowing me to participate in the care of this interesting patient.        "

## 2019-05-13 ENCOUNTER — OFFICE VISIT (OUTPATIENT)
Dept: SURGERY | Facility: CLINIC | Age: 44
End: 2019-05-13

## 2019-05-13 DIAGNOSIS — Z98.890 STATUS POST EXCISIONAL BIOPSY: Primary | ICD-10-CM

## 2019-05-13 PROCEDURE — 99024 POSTOP FOLLOW-UP VISIT: CPT | Performed by: SURGERY

## 2019-05-13 NOTE — PROGRESS NOTES
Cara Ricketts 44 y.o. female presents for PO FU suture removal back.  PCP Dr. Wood.        HPI     Above noted and agree    Review of Systems        Past Medical History:   Diagnosis Date   • Anxiety and depression    • Asthma    • COPD (chronic obstructive pulmonary disease) (CMS/HCC)    • Hypertension    • TMJ (dislocation of temporomandibular joint)            Past Surgical History:   Procedure Laterality Date   • EAR TUBES     • TUBAL ABDOMINAL LIGATION             Physical Exam  Wound healed, suture removed      There were no vitals taken for this visit.        Cara was seen today for post-op follow-up and suture / staple removal.    Diagnoses and all orders for this visit:    Status post excisional biopsy    return to clinic as needed.    Thank you for allowing me to participate in the care of this interesting patient.

## 2021-09-15 ENCOUNTER — OFFICE VISIT (OUTPATIENT)
Dept: OBSTETRICS AND GYNECOLOGY | Facility: CLINIC | Age: 46
End: 2021-09-15

## 2021-09-15 VITALS
BODY MASS INDEX: 26.31 KG/M2 | SYSTOLIC BLOOD PRESSURE: 130 MMHG | HEIGHT: 62 IN | DIASTOLIC BLOOD PRESSURE: 82 MMHG | WEIGHT: 143 LBS

## 2021-09-15 DIAGNOSIS — Z11.51 SPECIAL SCREENING EXAMINATION FOR HUMAN PAPILLOMAVIRUS (HPV): ICD-10-CM

## 2021-09-15 DIAGNOSIS — Z01.419 PAP SMEAR, LOW-RISK: ICD-10-CM

## 2021-09-15 DIAGNOSIS — Z01.419 ROUTINE GYNECOLOGICAL EXAMINATION: Primary | ICD-10-CM

## 2021-09-15 LAB
B-HCG UR QL: NEGATIVE
BILIRUB BLD-MCNC: NEGATIVE MG/DL
CLARITY, POC: CLEAR
COLOR UR: YELLOW
GLUCOSE UR STRIP-MCNC: NEGATIVE MG/DL
INTERNAL NEGATIVE CONTROL: NEGATIVE
INTERNAL POSITIVE CONTROL: POSITIVE
KETONES UR QL: NEGATIVE
LEUKOCYTE EST, POC: NEGATIVE
Lab: NORMAL
NITRITE UR-MCNC: NEGATIVE MG/ML
PH UR: 6 [PH] (ref 5–8)
PROT UR STRIP-MCNC: NEGATIVE MG/DL
RBC # UR STRIP: NEGATIVE /UL
SP GR UR: 1.02 (ref 1–1.03)
UROBILINOGEN UR QL: NORMAL

## 2021-09-15 PROCEDURE — 81002 URINALYSIS NONAUTO W/O SCOPE: CPT | Performed by: OBSTETRICS & GYNECOLOGY

## 2021-09-15 PROCEDURE — 2014F MENTAL STATUS ASSESS: CPT | Performed by: OBSTETRICS & GYNECOLOGY

## 2021-09-15 PROCEDURE — 3008F BODY MASS INDEX DOCD: CPT | Performed by: OBSTETRICS & GYNECOLOGY

## 2021-09-15 PROCEDURE — 99386 PREV VISIT NEW AGE 40-64: CPT | Performed by: OBSTETRICS & GYNECOLOGY

## 2021-09-15 PROCEDURE — 81025 URINE PREGNANCY TEST: CPT | Performed by: OBSTETRICS & GYNECOLOGY

## 2021-09-15 RX ORDER — PRAZOSIN HYDROCHLORIDE 1 MG/1
1 CAPSULE ORAL 2 TIMES DAILY
COMMUNITY
Start: 2021-09-06

## 2021-09-15 RX ORDER — SERTRALINE HYDROCHLORIDE 100 MG/1
TABLET, FILM COATED ORAL
COMMUNITY
Start: 2021-08-25 | End: 2021-09-15

## 2021-09-15 NOTE — PROGRESS NOTES
GYN Annual Exam     CC- Here for annual exam.     Cara Ricketts is a 46 y.o. female who presents for annual well woman exam. Periods are absent. Ablation.    OB History    No obstetric history on file.         Current contraception: tubal ligation  History of abnormal Pap smear: no  Family history of uterine, colon or ovarian cancer: no  History of abnormal mammogram: no  Family history of breast cancer: yes - aunt  Last Pap : 2016    Past Medical History:   Diagnosis Date   • Anxiety and depression    • Asthma    • COPD (chronic obstructive pulmonary disease) (CMS/HCC)    • Hypertension    • TMJ (dislocation of temporomandibular joint)        Past Surgical History:   Procedure Laterality Date   • EAR TUBES     • HYSTEROSCOPY ENDOMETRIAL ABLATION     • TUBAL ABDOMINAL LIGATION           Current Outpatient Medications:   •  albuterol sulfate  (90 Base) MCG/ACT inhaler, Inhale 2 puffs., Disp: , Rfl:   •  Anoro Ellipta 62.5-25 MCG/INH aerosol powder  inhaler, Inhale 1 puff Daily., Disp: , Rfl:   •  diclofenac (VOLTAREN) 25 MG EC tablet, Take 25 mg by mouth 2 (Two) Times a Day As Needed. for pain, Disp: , Rfl:   •  fluticasone (FLONASE) 50 MCG/ACT nasal spray, INSTILL 2 SPRAYS IN EACH NOSTRIL ONCE A DAY, Disp: , Rfl:   •  hydroCHLOROthiazide (HYDRODIURIL) 12.5 MG tablet, Take 12.5 mg by mouth Daily., Disp: , Rfl:   •  hydrOXYzine pamoate (VISTARIL) 50 MG capsule, TAKE 1 CAPSULE BY MOUTH EVERY 8 HOURS AS NEEDED FOR ANXIETY, Disp: , Rfl:   •  montelukast (SINGULAIR) 10 MG tablet, Take 10 mg by mouth every night at bedtime., Disp: , Rfl:   •  prazosin (MINIPRESS) 1 MG capsule, Take 1 mg by mouth 2 (Two) Times a Day., Disp: , Rfl:   •  sertraline (ZOLOFT) 50 MG tablet, Take 50 mg by mouth Daily., Disp: , Rfl:   •  traZODone (DESYREL) 100 MG tablet, Take 200 mg by mouth every night at bedtime., Disp: , Rfl:     Allergies   Allergen Reactions   • Amoxicillin    • Milk-Related Compounds Nausea Only   • Penicillins  "Hives       Social History     Tobacco Use   • Smoking status: Heavy Tobacco Smoker     Packs/day: 1.00   • Smokeless tobacco: Never Used   Vaping Use   • Vaping Use: Never used   Substance Use Topics   • Alcohol use: Not Currently   • Drug use: No         Family History   Problem Relation Age of Onset   • Hypertension Father    • Alzheimer's disease Paternal Grandmother    • Emphysema Paternal Grandfather        Review of Systems   Constitutional: Negative for appetite change, fever and unexpected weight change.   HENT: Negative for congestion and sore throat.    Respiratory: Negative for cough and shortness of breath.    Cardiovascular: Negative for chest pain and palpitations.   Gastrointestinal: Negative for abdominal distention, abdominal pain, constipation, diarrhea, nausea and vomiting.   Endocrine: Negative.    Genitourinary: Negative for dyspareunia, menstrual problem, pelvic pain and vaginal discharge.   Skin: Negative.    Neurological: Negative for dizziness and syncope.   Hematological: Negative.    Psychiatric/Behavioral: Negative for dysphoric mood and sleep disturbance. The patient is not nervous/anxious.        /82   Ht 157.5 cm (62\")   Wt 64.9 kg (143 lb)   BMI 26.16 kg/m²     Physical Exam  Vitals and nursing note reviewed. Exam conducted with a chaperone present.   Constitutional:       Appearance: She is well-developed.   HENT:      Head: Normocephalic and atraumatic.   Neck:      Thyroid: No thyromegaly.   Cardiovascular:      Rate and Rhythm: Normal rate and regular rhythm.   Pulmonary:      Effort: Pulmonary effort is normal.      Breath sounds: Normal breath sounds.   Chest:      Breasts: Breasts are symmetrical.         Right: No mass or nipple discharge.         Left: No mass or nipple discharge.   Abdominal:      General: Bowel sounds are normal. There is no distension.      Palpations: Abdomen is soft. There is no mass.      Tenderness: There is no abdominal tenderness. There is " no guarding or rebound.   Genitourinary:     General: Normal vulva.      Exam position: Supine.      Labia:         Right: No lesion.         Left: No lesion.       Vagina: Normal.      Cervix: No cervical motion tenderness or discharge.      Uterus: Normal.       Adnexa:         Right: No mass.          Left: No mass.         Musculoskeletal:         General: Normal range of motion.      Cervical back: Normal range of motion and neck supple.   Skin:     General: Skin is warm and dry.   Neurological:      Mental Status: She is alert and oriented to person, place, and time.   Psychiatric:         Behavior: Behavior normal.         Thought Content: Thought content normal.         Judgment: Judgment normal.         Diagnoses and all orders for this visit:    Routine gynecological examination  -     POC Urinalysis Dipstick  -     POC Pregnancy, Urine  -     IgP, Aptima HPV    Pap smear, low-risk  -     IgP, Aptima HPV    Special screening examination for human papillomavirus (HPV)  -     IgP, Aptima HPV    Other orders  -     prazosin (MINIPRESS) 1 MG capsule; Take 1 mg by mouth 2 (Two) Times a Day.  -     Discontinue: sertraline (ZOLOFT) 100 MG tablet  -     Anoro Ellipta 62.5-25 MCG/INH aerosol powder  inhaler; Inhale 1 puff Daily.        Assessment     1) GYN annual well woman exam.   2) mammogram ordered, patient would like to get at [facility]  3) patient with chronic lung disease, fully vaccinated.     Plan     1) Breast Health - Clinical breast exam & mammogram yearly, Self breast awareness monthly  2) Pap - done today  3) Smoking status - Cara Ricketts  reports that she has been smoking. She has been smoking about 1.00 pack per day. She has never used smokeless tobacco.. I have educated her on the risk of diseases from using tobacco products such as cancer, COPD and heart disease.     I advised her to quit and she is not willing to quit.    I spent 3  minutes counseling the patient.  4) Colon  health - screening colonoscopy recommended if not up to date  5) Bone health - Weight bearing exercise, dietary calcium recommendations and vitamin D reviewed.   6) Seat belts recommended  7) Follow up prn and one year    Encounter Diagnoses   Name Primary?   • Routine gynecological examination Yes   • Pap smear, low-risk    • Special screening examination for human papillomavirus (HPV)          Jake Humphreys MD  9/15/2021  16:19 EDT

## 2021-09-20 LAB
CYTOLOGIST CVX/VAG CYTO: NORMAL
CYTOLOGY CVX/VAG DOC CYTO: NORMAL
CYTOLOGY CVX/VAG DOC THIN PREP: NORMAL
DX ICD CODE: NORMAL
HIV 1 & 2 AB SER-IMP: NORMAL
HPV I/H RISK 4 DNA CVX QL PROBE+SIG AMP: NEGATIVE
OTHER STN SPEC: NORMAL
STAT OF ADQ CVX/VAG CYTO-IMP: NORMAL

## 2021-12-01 ENCOUNTER — OFFICE VISIT (OUTPATIENT)
Dept: OBSTETRICS AND GYNECOLOGY | Facility: CLINIC | Age: 46
End: 2021-12-01

## 2021-12-01 VITALS
SYSTOLIC BLOOD PRESSURE: 118 MMHG | DIASTOLIC BLOOD PRESSURE: 80 MMHG | WEIGHT: 157.4 LBS | BODY MASS INDEX: 28.97 KG/M2 | HEIGHT: 62 IN

## 2021-12-01 DIAGNOSIS — L91.8 SKIN TAG: Primary | ICD-10-CM

## 2021-12-01 LAB
BILIRUB BLD-MCNC: NEGATIVE MG/DL
CLARITY, POC: CLEAR
COLOR UR: YELLOW
GLUCOSE UR STRIP-MCNC: NEGATIVE MG/DL
KETONES UR QL: NEGATIVE
LEUKOCYTE EST, POC: NEGATIVE
NITRITE UR-MCNC: NEGATIVE MG/ML
PH UR: 5 [PH] (ref 5–8)
PROT UR STRIP-MCNC: NEGATIVE MG/DL
RBC # UR STRIP: NEGATIVE /UL
SP GR UR: 1 (ref 1–1.03)
UROBILINOGEN UR QL: NORMAL

## 2021-12-01 PROCEDURE — 81002 URINALYSIS NONAUTO W/O SCOPE: CPT | Performed by: OBSTETRICS & GYNECOLOGY

## 2021-12-01 PROCEDURE — 11200 RMVL SKIN TAGS UP TO&INC 15: CPT | Performed by: OBSTETRICS & GYNECOLOGY

## 2021-12-01 RX ORDER — SERTRALINE HYDROCHLORIDE 100 MG/1
TABLET, FILM COATED ORAL
COMMUNITY
Start: 2021-11-15

## 2021-12-01 NOTE — PROGRESS NOTES
Patient here for vulvar skin tag removal.  Procedure was described in detail.  Risk benefits alternatives were discussed.  Informed consent was obtained and papers were signed.  Timeout was performed.    Right labial/vulvar skin tag noted on previous exam appears benign.  The area was prepped with a Betadine solution.  1 cc of 2% lidocaine without epinephrine was injected at the base.  The skin tag was removed at the base.  Hemostasis was achieved with silver nitrate stick and local pressure.  Patient tolerated procedure well and there was good hemostasis following the procedure.    Impression: Skin tag removal    Patient declined to send the specimen to pathology for financial reasons.  It appeared benign.  Okay to take off the rest of the day from work.  Postop instructions given along with warnings to call for fever, redness or swelling.    Jake Humphreys MD

## 2022-05-18 ENCOUNTER — OFFICE VISIT (OUTPATIENT)
Dept: SURGERY | Facility: CLINIC | Age: 47
End: 2022-05-18

## 2022-05-18 VITALS
DIASTOLIC BLOOD PRESSURE: 70 MMHG | SYSTOLIC BLOOD PRESSURE: 118 MMHG | WEIGHT: 157 LBS | HEIGHT: 63 IN | BODY MASS INDEX: 27.82 KG/M2 | HEART RATE: 80 BPM | RESPIRATION RATE: 20 BRPM

## 2022-05-18 DIAGNOSIS — R11.0 NAUSEA: ICD-10-CM

## 2022-05-18 DIAGNOSIS — Z86.010 HISTORY OF COLON POLYPS: ICD-10-CM

## 2022-05-18 DIAGNOSIS — R10.11 RUQ PAIN: ICD-10-CM

## 2022-05-18 DIAGNOSIS — Z87.19 HISTORY OF GALLSTONES: ICD-10-CM

## 2022-05-18 DIAGNOSIS — R19.7 DIARRHEA, UNSPECIFIED TYPE: ICD-10-CM

## 2022-05-18 DIAGNOSIS — R10.32 ABDOMINAL PAIN, LEFT LOWER QUADRANT: Primary | ICD-10-CM

## 2022-05-18 PROCEDURE — 99203 OFFICE O/P NEW LOW 30 MIN: CPT | Performed by: SURGERY

## 2022-05-18 RX ORDER — ONDANSETRON 4 MG/1
4 TABLET, FILM COATED ORAL EVERY 8 HOURS PRN
COMMUNITY

## 2022-05-18 RX ORDER — OMEPRAZOLE 20 MG/1
20 CAPSULE, DELAYED RELEASE ORAL DAILY
COMMUNITY
End: 2022-10-13

## 2022-05-18 NOTE — PROGRESS NOTES
Cara Ricketts 47 y.o. female presents @ the req of RAMIRO Srinivasan for eval of LLQ pain, RUQ pain, N/V, bloating, chg in bowel habits = constipation alt with dairrhea.  Pt reports Lap Mirella approx 12 yrs ago and after surg was found to have a gallstone in her CBD.  Pt states she went to hosp to have stent placed and they could not find the gallstone.  Pt is concerned that the gallstone is causing her problems.  Pt also reports she is due for repeat c-scope.     Chief Complaint   Patient presents with   • Abdominal Pain     LLQ     Above-noted agree.  Cara is known to my service.  I saw her back in 2013 and performed an EGD and colonoscopy.  I found gastritis and colon polyps.  She is way past due for a repeat colonoscopy.  Prior to that she had a laparoscopic cholecystectomy performed and wound up having stents placed for choledocholithiasis.  She is concerned that the gallstone in her common bile duct was never found.  She has had a history of pancreatitis.  She used to have been an alcoholic.  She has not had any alcohol in many years.  She is still a heavy tobacco smoker though.  She has been having a lot of issues with right upper quadrant pain and nausea.  She has a lot of bloating and left sided abdominal pain.  She also has a change in Bowel habits.  She has no chest pain or shortness of breath.  She has no fevers or chills.  She has other complaints.        Abdominal Pain             Review of Systems   Gastrointestinal: Positive for abdominal pain.   All other systems reviewed and are negative.            Current Outpatient Medications:   •  albuterol sulfate  (90 Base) MCG/ACT inhaler, Inhale 2 puffs., Disp: , Rfl:   •  Anoro Ellipta 62.5-25 MCG/INH aerosol powder  inhaler, Inhale 1 puff Daily., Disp: , Rfl:   •  fluticasone (FLONASE) 50 MCG/ACT nasal spray, INSTILL 2 SPRAYS IN EACH NOSTRIL ONCE A DAY, Disp: , Rfl:   •  hydroCHLOROthiazide (HYDRODIURIL) 12.5 MG tablet, Take 12.5 mg by mouth  Daily., Disp: , Rfl:   •  hydrOXYzine pamoate (VISTARIL) 50 MG capsule, TAKE 1 CAPSULE BY MOUTH EVERY 8 HOURS AS NEEDED FOR ANXIETY, Disp: , Rfl:   •  montelukast (SINGULAIR) 10 MG tablet, Take 10 mg by mouth every night at bedtime., Disp: , Rfl:   •  omeprazole (priLOSEC) 20 MG capsule, Take 20 mg by mouth Daily., Disp: , Rfl:   •  ondansetron (ZOFRAN) 4 MG tablet, Take 4 mg by mouth Every 8 (Eight) Hours As Needed for Nausea or Vomiting., Disp: , Rfl:   •  prazosin (MINIPRESS) 1 MG capsule, Take 1 mg by mouth 2 (Two) Times a Day., Disp: , Rfl:   •  sertraline (ZOLOFT) 100 MG tablet, , Disp: , Rfl:   •  traZODone (DESYREL) 100 MG tablet, Take 200 mg by mouth every night at bedtime., Disp: , Rfl:         Allergies   Allergen Reactions   • Amoxicillin    • Milk-Related Compounds Nausea Only   • Penicillins Hives           Past Medical History:   Diagnosis Date   • Anxiety and depression    • Asthma    • COPD (chronic obstructive pulmonary disease) (HCC)    • Hypertension    • TMJ (dislocation of temporomandibular joint)            Past Surgical History:   Procedure Laterality Date   • COLONOSCOPY     • EAR TUBES     • ENDOSCOPY     • ERCP W/ PLASTIC STENT PLACEMENT     • HYSTEROSCOPY ENDOMETRIAL ABLATION     • LAPAROSCOPIC CHOLECYSTECTOMY     • TUBAL ABDOMINAL LIGATION             Social History     Tobacco Use   • Smoking status: Heavy Tobacco Smoker     Packs/day: 0.50   • Smokeless tobacco: Current User   Vaping Use   • Vaping Use: Never used   Substance Use Topics   • Alcohol use: Not Currently   • Drug use: No             There is no immunization history on file for this patient.        Physical Exam  Vitals and nursing note reviewed.   Constitutional:       Appearance: Normal appearance.   HENT:      Head: Normocephalic and atraumatic.   Cardiovascular:      Rate and Rhythm: Normal rate and regular rhythm.   Pulmonary:      Effort: Pulmonary effort is normal.      Breath sounds: Normal breath sounds.  "  Abdominal:      General: Bowel sounds are normal.      Palpations: Abdomen is soft.   Musculoskeletal:         General: No swelling or tenderness.   Skin:     General: Skin is warm and dry.   Neurological:      General: No focal deficit present.      Mental Status: She is alert and oriented to person, place, and time.   Psychiatric:         Mood and Affect: Mood normal.         Behavior: Behavior normal.         Debilities/Disabilities Identified: None    Emotional Behavior: Appropriate      /70   Pulse 80   Resp 20   Ht 160 cm (63\")   Wt 71.2 kg (157 lb)   BMI 27.81 kg/m²         Diagnoses and all orders for this visit:    1. Abdominal pain, left lower quadrant (Primary)    2. Diarrhea, unspecified type    3. RUQ pain    4. Nausea    5. History of colon polyps    6. History of gallstones    Will get Cara scheduled for EGD, colonoscopy, and MRCP.  I discussed with the patient the benefits and risks of performing endoscopy.  Benefits and risks were not limited to but including bleeding, infection, perforation, complications of anesthesia, aspiration.  The patient appeared to understand and is willing to proceed.    Thank you for allowing me to participate in the care of this interesting patient.            "

## 2022-05-19 DIAGNOSIS — Z87.19 HISTORY OF GALLSTONES: ICD-10-CM

## 2022-05-19 DIAGNOSIS — R19.7 DIARRHEA, UNSPECIFIED TYPE: ICD-10-CM

## 2022-05-19 DIAGNOSIS — R11.0 NAUSEA: ICD-10-CM

## 2022-05-19 DIAGNOSIS — R10.11 RUQ PAIN: ICD-10-CM

## 2022-05-19 DIAGNOSIS — R10.32 ABDOMINAL PAIN, LEFT LOWER QUADRANT: Primary | ICD-10-CM

## 2022-05-19 PROBLEM — Z86.010 HISTORY OF COLON POLYPS: Status: ACTIVE | Noted: 2022-05-19

## 2022-05-19 PROBLEM — Z86.0100 HISTORY OF COLON POLYPS: Status: ACTIVE | Noted: 2022-05-19

## 2022-05-20 ENCOUNTER — PATIENT ROUNDING (BHMG ONLY) (OUTPATIENT)
Dept: SURGERY | Facility: CLINIC | Age: 47
End: 2022-05-20

## 2022-05-20 NOTE — PROGRESS NOTES
May 20, 2022    Hello, may I speak with Cara Ricketts?    My name is Cara Johnson      I am  with [facility]  [address] [place] [address] [phone].    Before we get started may I verify your date of birth? 1975    I am calling to officially welcome you to our practice and ask about your recent visit. Is this a good time to talk? yes    Tell me about your visit with us. What things went well?  She was pleased with her visit and the care she received.       We're always looking for ways to make our patients' experiences even better. Do you have recommendations on ways we may improve?  no    Overall were you satisfied with your first visit to our practice? yes       I appreciate you taking the time to speak with me today. Is there anything else I can do for you? no      Thank you, and have a great day.

## 2022-06-06 ENCOUNTER — HOSPITAL ENCOUNTER (OUTPATIENT)
Dept: MRI IMAGING | Facility: HOSPITAL | Age: 47
End: 2022-06-06

## 2022-06-21 ENCOUNTER — HOSPITAL ENCOUNTER (OUTPATIENT)
Dept: MRI IMAGING | Facility: HOSPITAL | Age: 47
Discharge: HOME | End: 2022-06-21
Admitting: SURGERY

## 2022-06-21 DIAGNOSIS — Z87.19 HISTORY OF GALLSTONES: ICD-10-CM

## 2022-06-21 DIAGNOSIS — R11.0 NAUSEA: ICD-10-CM

## 2022-06-21 DIAGNOSIS — R19.7 DIARRHEA, UNSPECIFIED TYPE: ICD-10-CM

## 2022-06-21 DIAGNOSIS — R10.11 RUQ PAIN: ICD-10-CM

## 2022-06-21 DIAGNOSIS — R10.32 ABDOMINAL PAIN, LEFT LOWER QUADRANT: ICD-10-CM

## 2022-06-21 LAB — CREAT BLDA-MCNC: 0.9 MG/DL (ref 0.6–1.3)

## 2022-06-21 PROCEDURE — 0 GADOBENATE DIMEGLUMINE 529 MG/ML SOLUTION: Performed by: SURGERY

## 2022-06-21 PROCEDURE — 74183 MRI ABD W/O CNTR FLWD CNTR: CPT

## 2022-06-21 PROCEDURE — A9577 INJ MULTIHANCE: HCPCS | Performed by: SURGERY

## 2022-06-21 PROCEDURE — 82565 ASSAY OF CREATININE: CPT

## 2022-06-21 RX ADMIN — GADOBENATE DIMEGLUMINE 14 ML: 529 INJECTION, SOLUTION INTRAVENOUS at 11:37

## 2022-06-27 ENCOUNTER — ANESTHESIA EVENT (OUTPATIENT)
Dept: PERIOP | Facility: HOSPITAL | Age: 47
End: 2022-06-27

## 2022-06-28 ENCOUNTER — ANESTHESIA (OUTPATIENT)
Dept: PERIOP | Facility: HOSPITAL | Age: 47
End: 2022-06-28

## 2022-06-28 ENCOUNTER — HOSPITAL ENCOUNTER (OUTPATIENT)
Facility: HOSPITAL | Age: 47
Setting detail: HOSPITAL OUTPATIENT SURGERY
Discharge: HOME | End: 2022-06-28
Attending: SURGERY | Admitting: SURGERY

## 2022-06-28 VITALS
HEART RATE: 45 BPM | OXYGEN SATURATION: 98 % | TEMPERATURE: 97.5 F | SYSTOLIC BLOOD PRESSURE: 125 MMHG | DIASTOLIC BLOOD PRESSURE: 86 MMHG | RESPIRATION RATE: 17 BRPM | WEIGHT: 150 LBS | BODY MASS INDEX: 26.57 KG/M2

## 2022-06-28 DIAGNOSIS — R11.0 NAUSEA: ICD-10-CM

## 2022-06-28 DIAGNOSIS — Z86.010 HISTORY OF COLON POLYPS: ICD-10-CM

## 2022-06-28 DIAGNOSIS — R10.32 ABDOMINAL PAIN, LEFT LOWER QUADRANT: ICD-10-CM

## 2022-06-28 DIAGNOSIS — R10.11 RUQ PAIN: ICD-10-CM

## 2022-06-28 LAB — SARS-COV-2 RNA PNL SPEC NAA+PROBE: NOT DETECTED

## 2022-06-28 PROCEDURE — 87081 CULTURE SCREEN ONLY: CPT | Performed by: SURGERY

## 2022-06-28 PROCEDURE — 88305 TISSUE EXAM BY PATHOLOGIST: CPT | Performed by: SURGERY

## 2022-06-28 PROCEDURE — 45380 COLONOSCOPY AND BIOPSY: CPT | Performed by: SURGERY

## 2022-06-28 PROCEDURE — C9803 HOPD COVID-19 SPEC COLLECT: HCPCS

## 2022-06-28 PROCEDURE — 43239 EGD BIOPSY SINGLE/MULTIPLE: CPT | Performed by: SURGERY

## 2022-06-28 PROCEDURE — 25010000002 PROPOFOL 10 MG/ML EMULSION: Performed by: NURSE ANESTHETIST, CERTIFIED REGISTERED

## 2022-06-28 PROCEDURE — 87635 SARS-COV-2 COVID-19 AMP PRB: CPT | Performed by: SURGERY

## 2022-06-28 PROCEDURE — 45385 COLONOSCOPY W/LESION REMOVAL: CPT | Performed by: SURGERY

## 2022-06-28 RX ORDER — GLYCOPYRROLATE 0.2 MG/ML
INJECTION INTRAMUSCULAR; INTRAVENOUS AS NEEDED
Status: DISCONTINUED | OUTPATIENT
Start: 2022-06-28 | End: 2022-06-28 | Stop reason: SURG

## 2022-06-28 RX ORDER — SUCRALFATE 1 G/1
1 TABLET ORAL 4 TIMES DAILY
Qty: 120 TABLET | Refills: 1 | Status: SHIPPED | OUTPATIENT
Start: 2022-06-28 | End: 2023-06-28

## 2022-06-28 RX ORDER — SODIUM CHLORIDE, SODIUM LACTATE, POTASSIUM CHLORIDE, CALCIUM CHLORIDE 600; 310; 30; 20 MG/100ML; MG/100ML; MG/100ML; MG/100ML
9 INJECTION, SOLUTION INTRAVENOUS CONTINUOUS
Status: DISCONTINUED | OUTPATIENT
Start: 2022-06-28 | End: 2022-06-28 | Stop reason: HOSPADM

## 2022-06-28 RX ORDER — SODIUM CHLORIDE 0.9 % (FLUSH) 0.9 %
10 SYRINGE (ML) INJECTION EVERY 12 HOURS SCHEDULED
Status: DISCONTINUED | OUTPATIENT
Start: 2022-06-28 | End: 2022-06-28 | Stop reason: HOSPADM

## 2022-06-28 RX ORDER — LIDOCAINE HYDROCHLORIDE 20 MG/ML
INJECTION, SOLUTION INFILTRATION; PERINEURAL AS NEEDED
Status: DISCONTINUED | OUTPATIENT
Start: 2022-06-28 | End: 2022-06-28 | Stop reason: SURG

## 2022-06-28 RX ORDER — SODIUM CHLORIDE 0.9 % (FLUSH) 0.9 %
10 SYRINGE (ML) INJECTION AS NEEDED
Status: DISCONTINUED | OUTPATIENT
Start: 2022-06-28 | End: 2022-06-28 | Stop reason: HOSPADM

## 2022-06-28 RX ORDER — MAGNESIUM HYDROXIDE 1200 MG/15ML
LIQUID ORAL AS NEEDED
Status: DISCONTINUED | OUTPATIENT
Start: 2022-06-28 | End: 2022-06-28 | Stop reason: HOSPADM

## 2022-06-28 RX ORDER — MIDAZOLAM HYDROCHLORIDE 2 MG/2ML
1 INJECTION, SOLUTION INTRAMUSCULAR; INTRAVENOUS
Status: DISCONTINUED | OUTPATIENT
Start: 2022-06-28 | End: 2022-06-28 | Stop reason: HOSPADM

## 2022-06-28 RX ORDER — SODIUM CHLORIDE 9 MG/ML
40 INJECTION, SOLUTION INTRAVENOUS AS NEEDED
Status: DISCONTINUED | OUTPATIENT
Start: 2022-06-28 | End: 2022-06-28 | Stop reason: HOSPADM

## 2022-06-28 RX ORDER — PROPOFOL 10 MG/ML
VIAL (ML) INTRAVENOUS AS NEEDED
Status: DISCONTINUED | OUTPATIENT
Start: 2022-06-28 | End: 2022-06-28 | Stop reason: SURG

## 2022-06-28 RX ADMIN — SODIUM CHLORIDE, POTASSIUM CHLORIDE, SODIUM LACTATE AND CALCIUM CHLORIDE: 600; 310; 30; 20 INJECTION, SOLUTION INTRAVENOUS at 09:47

## 2022-06-28 RX ADMIN — PROPOFOL 50 MG: 10 INJECTION, EMULSION INTRAVENOUS at 10:01

## 2022-06-28 RX ADMIN — PROPOFOL 50 MG: 10 INJECTION, EMULSION INTRAVENOUS at 10:05

## 2022-06-28 RX ADMIN — LIDOCAINE HYDROCHLORIDE 100 MG: 20 INJECTION, SOLUTION INFILTRATION; PERINEURAL at 09:55

## 2022-06-28 RX ADMIN — GLYCOPYRROLATE 0.2 MG: 0.2 INJECTION INTRAMUSCULAR; INTRAVENOUS at 10:00

## 2022-06-28 RX ADMIN — LIDOCAINE HYDROCHLORIDE 100 MG: 20 INJECTION, SOLUTION INFILTRATION; PERINEURAL at 09:53

## 2022-06-28 RX ADMIN — PROPOFOL 100 MG: 10 INJECTION, EMULSION INTRAVENOUS at 09:53

## 2022-06-28 RX ADMIN — PROPOFOL 50 MG: 10 INJECTION, EMULSION INTRAVENOUS at 09:57

## 2022-06-28 NOTE — ANESTHESIA PREPROCEDURE EVALUATION
Anesthesia Evaluation     Patient summary reviewed and Nursing notes reviewed   no history of anesthetic complications:  NPO Solid Status: > 8 hours  NPO Liquid Status: > 8 hours           Airway   Mallampati: II  TM distance: >3 FB  Neck ROM: full  No difficulty expected  Dental    (+) edentulous    Pulmonary     breath sounds clear to auscultation  (+) COPD (used inhalers today) mild, asthma,  Cardiovascular   Exercise tolerance: good (4-7 METS)    Rhythm: regular  Rate: abnormal    (+) hypertension well controlled less than 2 medications, dysrhythmias Bradycardia,       Neuro/Psych  (+) headaches, psychiatric history Anxiety and Depression,    GI/Hepatic/Renal/Endo    (+)  GERD poorly controlled,      Musculoskeletal     (+) neck pain,   Abdominal    Substance History - negative use     OB/GYN          Other - negative ROS                       Anesthesia Plan    ASA 3     MAC     intravenous induction     Anesthetic plan, risks, benefits, and alternatives have been provided, discussed and informed consent has been obtained with: patient.  Use of blood products discussed with patient  Consented to blood products.       CODE STATUS:

## 2022-06-28 NOTE — ANESTHESIA POSTPROCEDURE EVALUATION
Patient: Cara Ricketts    Procedure Summary     Date: 06/28/22 Room / Location: [facility] ENDOSCOPY 1 /  [facility] OR    Anesthesia Start: 0947 Anesthesia Stop: 1015    Procedures:       Esophagogastroduodenoscopy with biopsy (N/A Esophagus)      Colonoscopy with polypectomy (N/A ) Diagnosis:       Abdominal pain, left lower quadrant      Diarrhea, unspecified type      RUQ pain      Nausea      History of colon polyps      History of gallstones      (Abdominal pain, left lower quadrant [R10.32])      (Diarrhea, unspecified type [R19.7])      (RUQ pain [R10.11])      (Nausea [R11.0])      (History of colon polyps [Z86.010])      (History of gallstones [Z87.19])    Surgeons: Zari Burnett DO Provider: Karon Paula CRNA    Anesthesia Type: MAC ASA Status: 3          Anesthesia Type: MAC    Vitals  Vitals Value Taken Time   /72 06/28/22 1018   Temp 97.5 °F (36.4 °C) 06/28/22 1018   Pulse 62 06/28/22 1018   Resp 16 06/28/22 1018   SpO2 99 % 06/28/22 1018           Post Anesthesia Care and Evaluation    Patient location during evaluation: PHASE II  Patient participation: complete - patient participated  Level of consciousness: awake  Pain management: adequate    Airway patency: patent  Anesthetic complications: No anesthetic complications  PONV Status: none  Cardiovascular status: acceptable  Respiratory status: acceptable  Hydration status: acceptable

## 2022-06-29 ENCOUNTER — TELEPHONE (OUTPATIENT)
Dept: SURGERY | Facility: CLINIC | Age: 47
End: 2022-06-29

## 2022-06-29 LAB
LAB AP CASE REPORT: NORMAL
PATH REPORT.FINAL DX SPEC: NORMAL
PATH REPORT.GROSS SPEC: NORMAL
UREASE TISS QL: NEGATIVE

## 2022-06-29 NOTE — TELEPHONE ENCOUNTER
Pt called and states she didn't sleep well last night due to diarrhea and stomach cramping.  Feels a little dizzy.  Pt req work note for today.  Dr. Burnett approves work note.  Pt states she will stop by and pick it up tomorrow. Pt enc to drink fluids today.

## 2022-07-13 ENCOUNTER — OFFICE VISIT (OUTPATIENT)
Dept: SURGERY | Facility: CLINIC | Age: 47
End: 2022-07-13

## 2022-07-13 DIAGNOSIS — Z72.0 TOBACCO ABUSE: ICD-10-CM

## 2022-07-13 DIAGNOSIS — K27.9 PUD (PEPTIC ULCER DISEASE): Primary | ICD-10-CM

## 2022-07-13 PROCEDURE — 99213 OFFICE O/P EST LOW 20 MIN: CPT | Performed by: SURGERY

## 2022-07-13 NOTE — PROGRESS NOTES
Cara Ricketts 47 y.o. female presents for PO FU EGD/C-SCOPE/Discuss MRCP.  Pt states she thinks the Sucralfate is helping, but she is having a bad day today with vomiting, diarrhea, and LLQ pain.. Pt and mom think possible side effects of one of routine meds.  PCP RAMIRO Srinivasan.      HPI   Above-noted agree.  Cara is here following her MRCP as well as for endoscopies.  She had erosive gastritis as well as a hyperplastic rectal polyp.  She had a very poor prep.  Her MRCP recommended a 1 year repeat.  On discussion with Cara she needs to quit smoking and quit drinking sodas.  Her mother was concerned that her medications were causing her symptoms.  Discussed with her that she needed to go to her primary care provider or whoever prescribes his medications to discuss it.  We also discussed that changing her lifestyle habits could enable her to stop medications altogether.      Review of Systems        Past Medical History:   Diagnosis Date   • Anxiety and depression    • Asthma    • COPD (chronic obstructive pulmonary disease) (HCC)    • Gallstones    • Hypertension    • TMJ (dislocation of temporomandibular joint)            Past Surgical History:   Procedure Laterality Date   • COLONOSCOPY     • COLONOSCOPY W/ POLYPECTOMY N/A 6/28/2022    Procedure: Colonoscopy with polypectomy;  Surgeon: Zari Burnett DO;  Location: [facility] OR;  Service: Gastroenterology;  Laterality: N/A;  poor prep  rectal polyps x2 (hot snare x1)   • EAR TUBES     • ENDOSCOPY     • ENDOSCOPY N/A 6/28/2022    Procedure: Esophagogastroduodenoscopy with biopsy;  Surgeon: Zari Burnett DO;  Location: [facility] OR;  Service: Gastroenterology;  Laterality: N/A;  GREGG test  erosive gastritis   • ERCP W/ PLASTIC STENT PLACEMENT     • HYSTEROSCOPY ENDOMETRIAL ABLATION     • LAPAROSCOPIC CHOLECYSTECTOMY     • TUBAL ABDOMINAL LIGATION             Physical Exam  Constitutional:       Appearance: Normal appearance.   Cardiovascular:       Rate and Rhythm: Normal rate and regular rhythm.   Pulmonary:      Effort: Pulmonary effort is normal.      Breath sounds: Normal breath sounds.   Abdominal:      General: Bowel sounds are normal.      Palpations: Abdomen is soft.   Skin:     General: Skin is warm and dry.   Neurological:      General: No focal deficit present.      Mental Status: She is alert and oriented to person, place, and time.   Psychiatric:         Mood and Affect: Mood normal.         Behavior: Behavior normal.             There were no vitals taken for this visit.        Diagnoses and all orders for this visit:    1. PUD (peptic ulcer disease) (Primary)    2. Tobacco abuse    I gave Cara diet sheet to follow.  We discussed tobacco cessation and lifestyle changes.  She will need at least a 5-year repeat colonoscopy but if she does improve her symptoms we may want to do that sooner since her prep was not good.  She may return anytime as needed.  We will put her on a 1 year repeat for the MRCP.    Thank you for allowing me to participate in the care of this interesting patient.

## 2024-05-24 ENCOUNTER — TELEPHONE (OUTPATIENT)
Dept: OBSTETRICS AND GYNECOLOGY | Facility: CLINIC | Age: 49
End: 2024-05-24

## 2024-05-24 NOTE — TELEPHONE ENCOUNTER
[facility] received a fax that requires your attention. The document has been indexed to the patient’s chart for your review.      Reason for sending: NEEDS REVIEW    Documents Description: MEDICAL RECORDS REQUEST    Name of Sender: [employer]    Date Indexed: 05/24/24

## 2025-01-08 ENCOUNTER — OFFICE VISIT (OUTPATIENT)
Dept: GASTROENTEROLOGY | Facility: CLINIC | Age: 50
End: 2025-01-08
Payer: COMMERCIAL

## 2025-01-08 ENCOUNTER — TELEPHONE (OUTPATIENT)
Dept: GASTROENTEROLOGY | Facility: CLINIC | Age: 50
End: 2025-01-08

## 2025-01-08 VITALS
DIASTOLIC BLOOD PRESSURE: 80 MMHG | SYSTOLIC BLOOD PRESSURE: 118 MMHG | BODY MASS INDEX: 30.26 KG/M2 | WEIGHT: 170.8 LBS | HEIGHT: 63 IN

## 2025-01-08 DIAGNOSIS — R11.2 NAUSEA AND VOMITING, UNSPECIFIED VOMITING TYPE: Primary | ICD-10-CM

## 2025-01-08 DIAGNOSIS — K21.00 GASTROESOPHAGEAL REFLUX DISEASE WITH ESOPHAGITIS WITHOUT HEMORRHAGE: ICD-10-CM

## 2025-01-08 RX ORDER — LORATADINE 10 MG/1
10 TABLET ORAL DAILY
COMMUNITY
Start: 2024-07-23

## 2025-01-08 RX ORDER — ONDANSETRON 4 MG/1
4 TABLET, ORALLY DISINTEGRATING ORAL EVERY 8 HOURS PRN
Qty: 30 TABLET | Refills: 1 | Status: SHIPPED | OUTPATIENT
Start: 2025-01-08

## 2025-01-08 RX ORDER — FOLIC ACID 1 MG/1
1 TABLET ORAL DAILY
COMMUNITY

## 2025-01-08 RX ORDER — METHOCARBAMOL 500 MG/1
TABLET, FILM COATED ORAL
COMMUNITY
Start: 2024-07-23

## 2025-01-08 RX ORDER — VIBEGRON 75 MG/1
75 TABLET, FILM COATED ORAL DAILY
COMMUNITY
Start: 2024-09-16

## 2025-01-08 RX ORDER — BUSPIRONE HYDROCHLORIDE 7.5 MG/1
7.5 TABLET ORAL 3 TIMES DAILY
COMMUNITY

## 2025-01-08 RX ORDER — VONOPRAZAN FUMARATE 26.72 MG/1
20 TABLET ORAL DAILY
Qty: 30 TABLET | Refills: 1 | Status: SHIPPED | OUTPATIENT
Start: 2025-01-08

## 2025-01-08 RX ORDER — DICYCLOMINE HYDROCHLORIDE 10 MG/1
10 CAPSULE ORAL 4 TIMES DAILY PRN
Qty: 120 CAPSULE | Refills: 2 | Status: SHIPPED | OUTPATIENT
Start: 2025-01-08

## 2025-01-08 RX ORDER — FERROUS SULFATE 325(65) MG
1 TABLET ORAL DAILY
COMMUNITY
Start: 2024-12-30

## 2025-01-08 RX ORDER — CLONIDINE HYDROCHLORIDE 0.1 MG/1
0.1 TABLET ORAL EVERY 6 HOURS PRN
COMMUNITY

## 2025-01-08 NOTE — PATIENT INSTRUCTIONS
Nausea: Use Zofran as needed  Start Voquezna daily, call if too expensive. Ok to take tums as needed  Eat 5-6 small meals a day, avoid eating close to bed time, avoid fatty and fried foods  Will need to schedule gastric emptying study, call office if you have not heard anything within a week    Bowels: Xifaxan three times a day for 14 days  Start and continue daily probiotic and fiber supplement (Recommend benefiber or other clear fiber, 5 g a day)    Cramping: Dicyclomine can be used up to 4 times a day

## 2025-01-08 NOTE — TELEPHONE ENCOUNTER
PA sent through [facility] for voquezna 20mg     Tried: omeprazole, reglan, carafate,     Former Dr. Fishman pt:  EGD/CLS- [facility] 6/28/2022  Erosive gastritis,       Former Dr. Nath:   Omeprazole 20mg daily

## 2025-01-08 NOTE — PROGRESS NOTES
PATIENT INFORMATION  Cara Ricketts       - 1975    CHIEF COMPLAINT  Chief Complaint   Patient presents with    Diarrhea    Vomiting    Nausea       HISTORY OF PRESENT ILLNESS  Here today with evaluation    Bowels have been an issue for yrs, bowels move daily, usually diarrhea, at least 2 up to 10 times, bad days are 5 out of 7. Colestipol previously caused constipation. Not taking anything OTC for bowels. Cramping is always LLQ. Cramping improved with BM for awhile, then resumes cramping next BM. Bad cramping before each BM. A few episodes of incontinence of stool, no bleeding. No fiber or probiotic. Has a lot of foul gas and bloating. Recent HP breath test negative, will request.    Cipro last week for UTI, bowels improved after. Has had some issues with urinary incontinence.     Nausea and vomiting, worse in morning when she gets up, and vomiting when having bad diarrhea and stomach is cramping, some improvement after BM. No HB or reflux. No OTC meds for stomach. PRN zofran helps some, but not taking consistently. No NSAIDs. Has not taken omeprazole recently, rather vague about medications. Reglan in past helped. Denies previous GES. Vomiting in morning is usually bile.  This is how she felt before stent placed.    Reports recent labs with PCP, will request.    2022 EGD with erosive gastritis, no H Pylori  2022 Colon with 1 HP polyp and poor prep, was unable to keep prep down at LOV.  8/10/2020 ERCP with stent removal   Cholecystectomy persistently felt bad until ERCP 10 yrs later        REVIEWED PERTINENT RESULTS/ LABS  Lab Results   Component Value Date    CASEREPORT  2022     Surgical Pathology Report                         Case: [id number]                                  Authorizing Provider:  Zari Burnett DO    Collected:           2022 10:09 AM          Ordering Location:     [facility]   Received:            2022 11:16 AM                                  OR                                                                           Pathologist:           Clara Burgess MD                                                          Specimen:    Large Intestine, Rectum, rectal polyps                                                     FINALDX  06/28/2022     1. Rectum, Polyps, Polypectomies:   A. Hyperplastic polyps.    raynab/lorena        Lab Results   Component Value Date    HGB 13.5 06/25/2020    MCV 98.6 06/25/2020     06/25/2020     (H) 06/25/2020     (H) 06/25/2020    INR 1.1 06/24/2020      No results found.    REVIEW OF SYSTEMS  Review of Systems      ACTIVE PROBLEMS  Patient Active Problem List    Diagnosis     RUQ pain [R10.11]     History of colon polyps [Z86.0100]     History of gallstones [Z87.19]     Decreased body weight [R63.4]     Abdominal pain, left lower quadrant [R10.32]     D (diarrhea) [R19.7]     Emesis [R11.10]     Acid reflux [K21.9]     BP (high blood pressure) [I10]     N&V (nausea and vomiting) [R11.2]          PAST MEDICAL HISTORY  Past Medical History:   Diagnosis Date    Anxiety and depression     Asthma     COPD (chronic obstructive pulmonary disease)     Gallstones     Hypertension     TMJ (dislocation of temporomandibular joint)          SURGICAL HISTORY  Past Surgical History:   Procedure Laterality Date    COLONOSCOPY      COLONOSCOPY W/ POLYPECTOMY N/A 6/28/2022    Procedure: Colonoscopy with polypectomy;  Surgeon: Zari Burnett DO;  Location: [facility];  Service: Gastroenterology;  Laterality: N/A;  poor prep  rectal polyps x2 (hot snare x1)    EAR TUBES      ENDOSCOPY      ENDOSCOPY N/A 6/28/2022    Procedure: Esophagogastroduodenoscopy with biopsy;  Surgeon: Zari Burnett DO;  Location: [facility];  Service: Gastroenterology;  Laterality: N/A;  GREGG test  erosive gastritis    ERCP W/ PLASTIC STENT PLACEMENT      HYSTEROSCOPY ENDOMETRIAL ABLATION      LAPAROSCOPIC CHOLECYSTECTOMY       TUBAL ABDOMINAL LIGATION           FAMILY HISTORY  Family History   Problem Relation Age of Onset    Hypertension Father     Alzheimer's disease Paternal Grandmother     Emphysema Paternal Grandfather          SOCIAL HISTORY  Social History     Occupational History    Not on file   Tobacco Use    Smoking status: Heavy Smoker     Current packs/day: 0.50     Types: Cigarettes     Passive exposure: Current    Smokeless tobacco: Never    Tobacco comments:     NA   Vaping Use    Vaping status: Never Used   Substance and Sexual Activity    Alcohol use: Not Currently    Drug use: No    Sexual activity: Yes     Comment: e-sure         CURRENT MEDICATIONS    Current Outpatient Medications:     albuterol sulfate  (90 Base) MCG/ACT inhaler, Inhale 2 puffs., Disp: , Rfl:     busPIRone (BUSPAR) 7.5 MG tablet, Take 1 tablet by mouth 3 (Three) Times a Day., Disp: , Rfl:     Cariprazine HCl (Vraylar) 1.5 MG capsule capsule, Take 1 capsule by mouth Daily., Disp: , Rfl:     cloNIDine (CATAPRES) 0.1 MG tablet, Take 1 tablet by mouth Every 6 (Six) Hours As Needed for High Blood Pressure., Disp: , Rfl:     dicyclomine (BENTYL) 10 MG capsule, Take 1 capsule by mouth 4 (Four) Times a Day As Needed for Abdominal Cramping., Disp: 120 capsule, Rfl: 2    folic acid (FOLVITE) 1 MG tablet, Take 1 tablet by mouth Daily., Disp: , Rfl:     Gemtesa 75 MG tablet, Take 1 tablet by mouth Daily., Disp: , Rfl:     loratadine (CLARITIN) 10 MG tablet, Take 1 tablet by mouth Daily., Disp: , Rfl:     methocarbamol (ROBAXIN) 500 MG tablet, TAKE 2 TABLETS BY MOUTH 4 TIMES DAILY AS NEEDED, Disp: , Rfl:     montelukast (SINGULAIR) 10 MG tablet, Take 1 tablet by mouth every night at bedtime., Disp: , Rfl:     prazosin (MINIPRESS) 1 MG capsule, Take 1 capsule by mouth 2 (Two) Times a Day., Disp: , Rfl:     promethazine-dextromethorphan (PROMETHAZINE-DM) 6.25-15 MG/5ML syrup, Take 5 mL by mouth 4 (Four) Times a Day As Needed for Cough., Disp: 118 mL, Rfl:  "0    sertraline (ZOLOFT) 100 MG tablet, , Disp: , Rfl:     traZODone (DESYREL) 100 MG tablet, Take 2 tablets by mouth every night at bedtime., Disp: , Rfl:     Vitamin D3 Maximum Strength 125 MCG (5000 UT) capsule capsule, Take 1 capsule by mouth Daily., Disp: , Rfl:     hydroCHLOROthiazide (HYDRODIURIL) 25 MG tablet, Take 25 mg by mouth Daily. (Patient not taking: Reported on 1/8/2025), Disp: , Rfl:     ondansetron ODT (ZOFRAN-ODT) 4 MG disintegrating tablet, Place 1 tablet on the tongue Every 8 (Eight) Hours As Needed for Nausea or Vomiting., Disp: 30 tablet, Rfl: 1    riFAXIMin (XIFAXAN) 550 MG tablet, Take 1 tablet by mouth Every 8 (Eight) Hours for 14 days., Disp: 42 tablet, Rfl: 2    Vonoprazan Fumarate (Voquezna) 20 MG tablet, Take 1 tablet by mouth Daily., Disp: 30 tablet, Rfl: 1    ALLERGIES  Amoxicillin, Milk-related compounds, and Penicillins    VITALS  Vitals:    01/08/25 1121   BP: 118/80   BP Location: Left arm   Patient Position: Sitting   Cuff Size: Large Adult   Weight: 77.5 kg (170 lb 12.8 oz)   Height: 160 cm (63\")       PHYSICAL EXAM  Debilities/Disabilities Identified: None  Emotional Behavior: Appropriate  Wt Readings from Last 3 Encounters:   01/08/25 77.5 kg (170 lb 12.8 oz)   11/28/22 68 kg (150 lb)   10/13/22 68 kg (150 lb)     Ht Readings from Last 1 Encounters:   01/08/25 160 cm (63\")     Body mass index is 30.26 kg/m².  Physical Exam  Constitutional:       General: She is not in acute distress.     Appearance: Normal appearance. She is not ill-appearing.   HENT:      Head: Normocephalic and atraumatic.      Mouth/Throat:      Mouth: Mucous membranes are moist.      Pharynx: No posterior oropharyngeal erythema.   Eyes:      General: No scleral icterus.  Cardiovascular:      Rate and Rhythm: Normal rate and regular rhythm.      Heart sounds: Normal heart sounds.   Pulmonary:      Effort: Pulmonary effort is normal.      Breath sounds: Normal breath sounds.   Abdominal:      General: " Abdomen is flat. Bowel sounds are normal. There is no distension.      Palpations: Abdomen is soft. There is no mass.      Tenderness: There is no abdominal tenderness. There is no guarding or rebound. Negative signs include Trent's sign.      Hernia: No hernia is present.   Musculoskeletal:      Cervical back: Neck supple.   Skin:     General: Skin is warm.      Capillary Refill: Capillary refill takes less than 2 seconds.   Neurological:      General: No focal deficit present.      Mental Status: She is alert and oriented to person, place, and time.   Psychiatric:         Mood and Affect: Mood normal.         Behavior: Behavior normal.         Thought Content: Thought content normal.         Judgment: Judgment normal.       CLINICAL DATA REVIEWED   reviewed previous lab results and integrated with today's visit, reviewed notes from other physicians and/or last GI encounter, reviewed previous endoscopy results and available photos, reviewed surgical pathology results from previous biopsies    ASSESSMENT  Diagnoses and all orders for this visit:    Nausea and vomiting, unspecified vomiting type  -     NM Gastric Emptying; Future    Gastroesophageal reflux disease with esophagitis without hemorrhage    Other orders  -     Vitamin D3 Maximum Strength 125 MCG (5000 UT) capsule capsule; Take 1 capsule by mouth Daily.  -     loratadine (CLARITIN) 10 MG tablet; Take 1 tablet by mouth Daily.  -     methocarbamol (ROBAXIN) 500 MG tablet; TAKE 2 TABLETS BY MOUTH 4 TIMES DAILY AS NEEDED  -     Gemtesa 75 MG tablet; Take 1 tablet by mouth Daily.  -     busPIRone (BUSPAR) 7.5 MG tablet; Take 1 tablet by mouth 3 (Three) Times a Day.  -     cloNIDine (CATAPRES) 0.1 MG tablet; Take 1 tablet by mouth Every 6 (Six) Hours As Needed for High Blood Pressure.  -     folic acid (FOLVITE) 1 MG tablet; Take 1 tablet by mouth Daily.  -     Cariprazine HCl (Vraylar) 1.5 MG capsule capsule; Take 1 capsule by mouth Daily.  -     Vonoprazan  Fumarate (Voquezna) 20 MG tablet; Take 1 tablet by mouth Daily.  -     ondansetron ODT (ZOFRAN-ODT) 4 MG disintegrating tablet; Place 1 tablet on the tongue Every 8 (Eight) Hours As Needed for Nausea or Vomiting.  -     riFAXIMin (XIFAXAN) 550 MG tablet; Take 1 tablet by mouth Every 8 (Eight) Hours for 14 days.  -     dicyclomine (BENTYL) 10 MG capsule; Take 1 capsule by mouth 4 (Four) Times a Day As Needed for Abdominal Cramping.          PLAN    Nausea and vomiting: Start Voquezna, schedule GES. PRN zofran  IBS-D: Treat with xifaxan, start daily fiber and probiotic. Dicyclomine PRN cramping. Will adjust treatment plan based on response.    Return in about 1 month (around 2/8/2025).    I have discussed the above plan with the patient.  They verbalize understanding and are in agreement with the plan.  They have been advised to contact the office for any questions, concerns, or changes related to their health.

## 2025-01-24 DIAGNOSIS — R11.2 NAUSEA AND VOMITING, UNSPECIFIED VOMITING TYPE: Primary | ICD-10-CM

## 2025-01-24 DIAGNOSIS — K21.00 GASTROESOPHAGEAL REFLUX DISEASE WITH ESOPHAGITIS WITHOUT HEMORRHAGE: ICD-10-CM

## 2025-01-28 ENCOUNTER — TELEPHONE (OUTPATIENT)
Dept: GASTROENTEROLOGY | Facility: CLINIC | Age: 50
End: 2025-01-28
Payer: COMMERCIAL

## 2025-01-28 NOTE — TELEPHONE ENCOUNTER
----- Message from Shani Roland sent at 1/24/2025  2:37 PM EST -----  Please let patient know we finally received PCP labs and no recent blood work, so placed orders to have collected.

## 2025-01-28 NOTE — TELEPHONE ENCOUNTER
Pt advised and voiced understanding.  She will get drawn @ [facility] within the next week.

## 2025-01-29 ENCOUNTER — LAB (OUTPATIENT)
Dept: LAB | Facility: HOSPITAL | Age: 50
End: 2025-01-29
Payer: COMMERCIAL

## 2025-01-29 LAB
ALBUMIN SERPL-MCNC: 3.9 G/DL (ref 3.5–5.2)
ALBUMIN/GLOB SERPL: 1.2 G/DL
ALP SERPL-CCNC: 115 U/L (ref 39–117)
ALT SERPL W P-5'-P-CCNC: 20 U/L (ref 1–33)
ANION GAP SERPL CALCULATED.3IONS-SCNC: 9.1 MMOL/L (ref 5–15)
AST SERPL-CCNC: 22 U/L (ref 1–32)
BASOPHILS # BLD AUTO: 0.04 10*3/MM3 (ref 0–0.2)
BASOPHILS NFR BLD AUTO: 0.9 % (ref 0–1.5)
BILIRUB SERPL-MCNC: <0.2 MG/DL (ref 0–1.2)
BUN SERPL-MCNC: 7 MG/DL (ref 6–20)
BUN/CREAT SERPL: 7 (ref 7–25)
CALCIUM SPEC-SCNC: 9.2 MG/DL (ref 8.6–10.5)
CHLORIDE SERPL-SCNC: 102 MMOL/L (ref 98–107)
CO2 SERPL-SCNC: 25.9 MMOL/L (ref 22–29)
CREAT SERPL-MCNC: 1 MG/DL (ref 0.57–1)
CRP SERPL-MCNC: 1.05 MG/DL (ref 0–0.5)
DEPRECATED RDW RBC AUTO: 45.3 FL (ref 37–54)
EGFRCR SERPLBLD CKD-EPI 2021: 70.1 ML/MIN/1.73
EOSINOPHIL # BLD AUTO: 0.06 10*3/MM3 (ref 0–0.4)
EOSINOPHIL NFR BLD AUTO: 1.3 % (ref 0.3–6.2)
ERYTHROCYTE [DISTWIDTH] IN BLOOD BY AUTOMATED COUNT: 12.5 % (ref 12.3–15.4)
GLOBULIN UR ELPH-MCNC: 3.3 GM/DL
GLUCOSE SERPL-MCNC: 110 MG/DL (ref 65–99)
HCT VFR BLD AUTO: 47.1 % (ref 34–46.6)
HGB BLD-MCNC: 15.1 G/DL (ref 12–15.9)
IMM GRANULOCYTES # BLD AUTO: 0.02 10*3/MM3 (ref 0–0.05)
IMM GRANULOCYTES NFR BLD AUTO: 0.4 % (ref 0–0.5)
LIPASE SERPL-CCNC: 23 U/L (ref 13–60)
LYMPHOCYTES # BLD AUTO: 1.38 10*3/MM3 (ref 0.7–3.1)
LYMPHOCYTES NFR BLD AUTO: 30.2 % (ref 19.6–45.3)
MCH RBC QN AUTO: 31.5 PG (ref 26.6–33)
MCHC RBC AUTO-ENTMCNC: 32.1 G/DL (ref 31.5–35.7)
MCV RBC AUTO: 98.1 FL (ref 79–97)
MONOCYTES # BLD AUTO: 0.33 10*3/MM3 (ref 0.1–0.9)
MONOCYTES NFR BLD AUTO: 7.2 % (ref 5–12)
NEUTROPHILS NFR BLD AUTO: 2.74 10*3/MM3 (ref 1.7–7)
NEUTROPHILS NFR BLD AUTO: 60 % (ref 42.7–76)
NRBC BLD AUTO-RTO: 0 /100 WBC (ref 0–0.2)
PLATELET # BLD AUTO: 274 10*3/MM3 (ref 140–450)
PMV BLD AUTO: 10.6 FL (ref 6–12)
POTASSIUM SERPL-SCNC: 3.9 MMOL/L (ref 3.5–5.2)
PROT SERPL-MCNC: 7.2 G/DL (ref 6–8.5)
RBC # BLD AUTO: 4.8 10*6/MM3 (ref 3.77–5.28)
SODIUM SERPL-SCNC: 137 MMOL/L (ref 136–145)
WBC NRBC COR # BLD AUTO: 4.57 10*3/MM3 (ref 3.4–10.8)

## 2025-01-29 PROCEDURE — 85025 COMPLETE CBC W/AUTO DIFF WBC: CPT

## 2025-01-29 PROCEDURE — 83690 ASSAY OF LIPASE: CPT

## 2025-01-29 PROCEDURE — 86364 TISS TRNSGLTMNASE EA IG CLAS: CPT

## 2025-01-29 PROCEDURE — 86140 C-REACTIVE PROTEIN: CPT

## 2025-01-29 PROCEDURE — 86258 DGP ANTIBODY EACH IG CLASS: CPT

## 2025-01-29 PROCEDURE — 80053 COMPREHEN METABOLIC PANEL: CPT

## 2025-01-29 PROCEDURE — 82784 ASSAY IGA/IGD/IGG/IGM EACH: CPT

## 2025-01-29 PROCEDURE — 86231 EMA EACH IG CLASS: CPT

## 2025-01-30 LAB
ENDOMYSIUM IGA SER QL: NEGATIVE
GLIADIN PEPTIDE IGA SER-ACNC: 5 UNITS (ref 0–19)
GLIADIN PEPTIDE IGG SER-ACNC: 3 UNITS (ref 0–19)
IGA SERPL-MCNC: 299 MG/DL (ref 87–352)
TTG IGA SER-ACNC: <2 U/ML (ref 0–3)
TTG IGG SER-ACNC: 2 U/ML (ref 0–5)

## 2025-02-05 ENCOUNTER — TELEPHONE (OUTPATIENT)
Dept: GASTROENTEROLOGY | Facility: CLINIC | Age: 50
End: 2025-02-05

## 2025-02-05 ENCOUNTER — OFFICE VISIT (OUTPATIENT)
Dept: GASTROENTEROLOGY | Facility: CLINIC | Age: 50
End: 2025-02-05
Payer: COMMERCIAL

## 2025-02-05 VITALS
DIASTOLIC BLOOD PRESSURE: 82 MMHG | BODY MASS INDEX: 30.16 KG/M2 | HEIGHT: 63 IN | SYSTOLIC BLOOD PRESSURE: 122 MMHG | WEIGHT: 170.2 LBS

## 2025-02-05 DIAGNOSIS — M62.89 PELVIC FLOOR DYSFUNCTION: ICD-10-CM

## 2025-02-05 DIAGNOSIS — K21.00 GASTROESOPHAGEAL REFLUX DISEASE WITH ESOPHAGITIS WITHOUT HEMORRHAGE: ICD-10-CM

## 2025-02-05 DIAGNOSIS — K58.1 IRRITABLE BOWEL SYNDROME WITH CONSTIPATION: Primary | ICD-10-CM

## 2025-02-05 DIAGNOSIS — R11.0 NAUSEA: ICD-10-CM

## 2025-02-05 DIAGNOSIS — R10.9 ABDOMINAL CRAMPING: ICD-10-CM

## 2025-02-05 RX ORDER — FLUCONAZOLE 150 MG/1
150 TABLET ORAL DAILY
COMMUNITY
Start: 2025-01-30

## 2025-02-05 RX ORDER — HYOSCYAMINE SULFATE 0.38 MG/1
0.38 TABLET, EXTENDED RELEASE ORAL EVERY 12 HOURS PRN
Qty: 30 TABLET | Refills: 3 | Status: SHIPPED | OUTPATIENT
Start: 2025-02-05

## 2025-02-05 RX ORDER — LUBIPROSTONE 8 UG/1
8 CAPSULE ORAL 2 TIMES DAILY WITH MEALS
Qty: 60 CAPSULE | Refills: 3 | Status: SHIPPED | OUTPATIENT
Start: 2025-02-05

## 2025-02-05 NOTE — TELEPHONE ENCOUNTER
The request has been approved. The authorization is effective from 02/05/2025 to 02/05/2026, as long as the member is enrolled in their current health plan. A written notification letter will follow with additional details.

## 2025-02-05 NOTE — PROGRESS NOTES
PATIENT INFORMATION  Cara Ricketts       - 1975    CHIEF COMPLAINT  Chief Complaint   Patient presents with    Nausea    Vomiting    Heartburn       HISTORY OF PRESENT ILLNESS  Here today with evaluation     Per LOV: Bowels have been an issue for yrs, bowels move daily, usually diarrhea, at least 2 up to 10 times, bad days are 5 out of 7. Colestipol previously caused constipation. Not taking anything OTC for bowels. Cramping is always LLQ. Cramping improved with BM for awhile, then resumes cramping next BM. Bad cramping before each BM. A few episodes of incontinence of stool, no bleeding. No fiber or probiotic. Has a lot of foul gas and bloating. Recent HP breath test negative, will request. Course of xifaxan given, reviewed instructions to start fiber and probiotic.    TODAY: Xifaxan helped diarrhea, less common,  BM once every 2-3 days, still cramping when needing to have BM, cramping relieved with BM, gas and bloating improved. Never started fiber  or probiotic due to cost. Did end up with yeast infection resolved with diflucan. Cramping much less often and on days she needs to have BM, a lot worse in morning, wakes up with pain and several hours after awakening will have BM and pain resolves. Can have sensation of stool being there but not moving.    Also having some issue with stress incontinence and did learn pelvic floor exercises awhile back, but has not participated in ongoing pelvic floor PT, patient is agreeable, so will send referral.     lipase, celiac panel, CMP all normal.     Per LOV: Nausea and vomiting, worse in morning when she gets up, and vomiting when having bad diarrhea and stomach is cramping, some improvement after BM. No HB or reflux. No OTC meds for stomach. PRN zofran helps some, but not taking consistently. No NSAIDs. Has not taken omeprazole recently, rather vague about medications. Reglan in past helped. Denies previous GES. Vomiting in morning is usually bile.  This is how  she felt before stent placed. Started Voquezna, ordered GES which is scheduled 3/6/25.     TODAY: Still with cramping an nausea before BM still, last vomiting episode on Friday with bile. On voquezna now and HB maybe 3 days a week, and about the same as when on omeprazole, no dysphagia. No NSAIDs. No OTC AA. Zofran helps a little, feels like she is used to the Zofran now.      6/28/2022 EGD with erosive gastritis, no H Pylori  6/28/2022 Colon with 1 HP polyp and poor prep, was unable to keep prep down.  8/10/2020 ERCP with stent removal  2010 Cholecystectomy persistently felt bad until ERCP 10 yrs later          REVIEWED PERTINENT RESULTS/ LABS  Lab Results   Component Value Date    CASEREPORT  06/28/2022     Surgical Pathology Report                         Case: [id number]                                  Authorizing Provider:  Zari Burnett DO    Collected:           06/28/2022 10:09 AM          Ordering Location:     [facility]   Received:            06/28/2022 11:16 AM                                 OR                                                                           Pathologist:           Clara Burgess MD                                                          Specimen:    Large Intestine, Rectum, rectal polyps                                                     FINALDX  06/28/2022     1. Rectum, Polyps, Polypectomies:   A. Hyperplastic polyps.    raynab/lorena        Lab Results   Component Value Date    HGB 15.1 01/29/2025    MCV 98.1 (H) 01/29/2025     01/29/2025    ALT 20 01/29/2025    AST 22 01/29/2025    INR 1.1 06/24/2020      No results found.    REVIEW OF SYSTEMS  Review of Systems      ACTIVE PROBLEMS  Patient Active Problem List    Diagnosis     RUQ pain [R10.11]     History of colon polyps [Z86.0100]     History of gallstones [Z87.19]     Decreased body weight [R63.4]     Abdominal pain, left lower quadrant [R10.32]     D (diarrhea) [R19.7]     Emesis [R11.10]      Acid reflux [K21.9]     BP (high blood pressure) [I10]     N&V (nausea and vomiting) [R11.2]          PAST MEDICAL HISTORY  Past Medical History:   Diagnosis Date    Anxiety and depression     Asthma     COPD (chronic obstructive pulmonary disease)     Gallstones     Hypertension     TMJ (dislocation of temporomandibular joint)          SURGICAL HISTORY  Past Surgical History:   Procedure Laterality Date    COLONOSCOPY      COLONOSCOPY W/ POLYPECTOMY N/A 6/28/2022    Procedure: Colonoscopy with polypectomy;  Surgeon: Zari Burnett DO;  Location: [facility] OR;  Service: Gastroenterology;  Laterality: N/A;  poor prep  rectal polyps x2 (hot snare x1)    EAR TUBES      ENDOSCOPY      ENDOSCOPY N/A 6/28/2022    Procedure: Esophagogastroduodenoscopy with biopsy;  Surgeon: Zari Burnett DO;  Location: [facility] OR;  Service: Gastroenterology;  Laterality: N/A;  GREGG test  erosive gastritis    ERCP W/ PLASTIC STENT PLACEMENT      HYSTEROSCOPY ENDOMETRIAL ABLATION      LAPAROSCOPIC CHOLECYSTECTOMY      TUBAL ABDOMINAL LIGATION           FAMILY HISTORY  Family History   Problem Relation Age of Onset    Hypertension Father     Alzheimer's disease Paternal Grandmother     Emphysema Paternal Grandfather          SOCIAL HISTORY  Social History     Occupational History    Not on file   Tobacco Use    Smoking status: Heavy Smoker     Current packs/day: 0.50     Types: Cigarettes     Passive exposure: Current    Smokeless tobacco: Never    Tobacco comments:     NA   Vaping Use    Vaping status: Never Used   Substance and Sexual Activity    Alcohol use: Not Currently    Drug use: No    Sexual activity: Yes     Comment: e-sure         CURRENT MEDICATIONS    Current Outpatient Medications:     albuterol sulfate  (90 Base) MCG/ACT inhaler, Inhale 2 puffs., Disp: , Rfl:     busPIRone (BUSPAR) 7.5 MG tablet, Take 1 tablet by mouth 3 (Three) Times a Day., Disp: , Rfl:     Cariprazine HCl (Vraylar) 1.5 MG capsule capsule, Take  "1 capsule by mouth Daily., Disp: , Rfl:     cloNIDine (CATAPRES) 0.1 MG tablet, Take 1 tablet by mouth Every 6 (Six) Hours As Needed for High Blood Pressure., Disp: , Rfl:     fluconazole (DIFLUCAN) 150 MG tablet, Take 1 tablet by mouth Daily., Disp: , Rfl:     folic acid (FOLVITE) 1 MG tablet, Take 1 tablet by mouth Daily., Disp: , Rfl:     Gemtesa 75 MG tablet, Take 1 tablet by mouth Daily., Disp: , Rfl:     loratadine (CLARITIN) 10 MG tablet, Take 1 tablet by mouth Daily., Disp: , Rfl:     methocarbamol (ROBAXIN) 500 MG tablet, TAKE 2 TABLETS BY MOUTH 4 TIMES DAILY AS NEEDED, Disp: , Rfl:     montelukast (SINGULAIR) 10 MG tablet, Take 1 tablet by mouth every night at bedtime., Disp: , Rfl:     ondansetron ODT (ZOFRAN-ODT) 4 MG disintegrating tablet, Place 1 tablet on the tongue Every 8 (Eight) Hours As Needed for Nausea or Vomiting., Disp: 30 tablet, Rfl: 1    prazosin (MINIPRESS) 1 MG capsule, Take 1 capsule by mouth 2 (Two) Times a Day., Disp: , Rfl:     sertraline (ZOLOFT) 100 MG tablet, , Disp: , Rfl:     traZODone (DESYREL) 100 MG tablet, Take 2 tablets by mouth every night at bedtime., Disp: , Rfl:     Vitamin D3 Maximum Strength 125 MCG (5000 UT) capsule capsule, Take 1 capsule by mouth Daily., Disp: , Rfl:     Vonoprazan Fumarate (Voquezna) 20 MG tablet, Take 1 tablet by mouth Daily., Disp: 30 tablet, Rfl: 1    hyoscyamine (Levbid) 0.375 MG 12 hr tablet, Take 1 tablet by mouth Every 12 (Twelve) Hours As Needed for Cramping., Disp: 30 tablet, Rfl: 3    lubiprostone (Amitiza) 8 MCG capsule, Take 1 capsule by mouth 2 (Two) Times a Day With Meals., Disp: 60 capsule, Rfl: 3    ALLERGIES  Amoxicillin, Milk-related compounds, and Penicillins    VITALS  Vitals:    02/05/25 0950   BP: 122/82   BP Location: Left arm   Patient Position: Sitting   Cuff Size: Adult   Weight: 77.2 kg (170 lb 3.2 oz)   Height: 160 cm (63\")       PHYSICAL EXAM  Debilities/Disabilities Identified: None  Emotional Behavior: Appropriate  Wt " "Readings from Last 3 Encounters:   02/05/25 77.2 kg (170 lb 3.2 oz)   01/08/25 77.5 kg (170 lb 12.8 oz)   11/28/22 68 kg (150 lb)     Ht Readings from Last 1 Encounters:   02/05/25 160 cm (63\")     Body mass index is 30.15 kg/m².  Physical Exam  Constitutional:       General: She is not in acute distress.     Appearance: Normal appearance. She is not ill-appearing.   HENT:      Head: Normocephalic and atraumatic.      Mouth/Throat:      Mouth: Mucous membranes are moist.      Pharynx: No posterior oropharyngeal erythema.   Eyes:      General: No scleral icterus.  Cardiovascular:      Rate and Rhythm: Normal rate and regular rhythm.      Heart sounds: Normal heart sounds.   Pulmonary:      Effort: Pulmonary effort is normal.      Breath sounds: Normal breath sounds.   Abdominal:      General: Abdomen is flat. Bowel sounds are normal. There is no distension.      Palpations: Abdomen is soft. There is no mass.      Tenderness: There is generalized abdominal tenderness. There is no guarding or rebound. Negative signs include Trent's sign.      Hernia: No hernia is present.      Comments: Improved from last check, slightly more tender carmen LQ   Musculoskeletal:      Cervical back: Neck supple.   Skin:     General: Skin is warm.      Capillary Refill: Capillary refill takes less than 2 seconds.   Neurological:      General: No focal deficit present.      Mental Status: She is alert and oriented to person, place, and time.   Psychiatric:         Mood and Affect: Mood normal.         Behavior: Behavior normal.         Thought Content: Thought content normal.         Judgment: Judgment normal.         CLINICAL DATA REVIEWED   reviewed previous lab results and integrated with today's visit, reviewed notes from other physicians and/or last GI encounter, reviewed previous endoscopy results and available photos, reviewed surgical pathology results from previous biopsies    ASSESSMENT  Diagnoses and all orders for this " visit:    Irritable bowel syndrome with constipation  -     lubiprostone (Amitiza) 8 MCG capsule; Take 1 capsule by mouth 2 (Two) Times a Day With Meals.  -     hyoscyamine (Levbid) 0.375 MG 12 hr tablet; Take 1 tablet by mouth Every 12 (Twelve) Hours As Needed for Cramping.    Abdominal cramping  -     lubiprostone (Amitiza) 8 MCG capsule; Take 1 capsule by mouth 2 (Two) Times a Day With Meals.  -     hyoscyamine (Levbid) 0.375 MG 12 hr tablet; Take 1 tablet by mouth Every 12 (Twelve) Hours As Needed for Cramping.  -     Pancreatic Elastase, Fecal - Stool, Per Rectum; Future  -     Calprotectin, Fecal - Stool, Per Rectum  -     H. Pylori Antigen, Stool - Stool, Per Rectum    Pelvic floor dysfunction  -     Ambulatory Referral to Physical Therapy for Evaluation & Treatment    Gastroesophageal reflux disease with esophagitis without hemorrhage    Nausea  -     Pancreatic Elastase, Fecal - Stool, Per Rectum; Future  -     Calprotectin, Fecal - Stool, Per Rectum  -     H. Pylori Antigen, Stool - Stool, Per Rectum    Other orders  -     fluconazole (DIFLUCAN) 150 MG tablet; Take 1 tablet by mouth Daily.          PLAN    IBS-C: Amitiza 8 mcg BID with meals, Start levbid BID  Fecal jeovany and HP stool  Continue voquezna    Return in about 1 month (around 3/5/2025).    I have discussed the above plan with the patient.  They verbalize understanding and are in agreement with the plan.  They have been advised to contact the office for any questions, concerns, or changes related to their health.

## 2025-02-17 ENCOUNTER — LAB (OUTPATIENT)
Dept: LAB | Facility: HOSPITAL | Age: 50
End: 2025-02-17
Payer: COMMERCIAL

## 2025-02-17 DIAGNOSIS — R11.0 NAUSEA: ICD-10-CM

## 2025-02-17 DIAGNOSIS — R10.9 ABDOMINAL CRAMPING: ICD-10-CM

## 2025-02-17 PROCEDURE — 87338 HPYLORI STOOL AG IA: CPT

## 2025-02-17 PROCEDURE — 82653 EL-1 FECAL QUANTITATIVE: CPT

## 2025-02-17 PROCEDURE — 83993 ASSAY FOR CALPROTECTIN FECAL: CPT

## 2025-02-18 LAB — H PYLORI AG STL QL IA: NEGATIVE

## 2025-02-19 DIAGNOSIS — R19.5 ELEVATED FECAL CALPROTECTIN: ICD-10-CM

## 2025-02-19 DIAGNOSIS — K21.00 GASTROESOPHAGEAL REFLUX DISEASE WITH ESOPHAGITIS WITHOUT HEMORRHAGE: Primary | ICD-10-CM

## 2025-02-19 DIAGNOSIS — R11.0 NAUSEA: ICD-10-CM

## 2025-02-19 LAB
CALPROTECTIN STL-MCNT: 153 UG/G (ref 0–120)
ELASTASE PANC STL-MCNT: >800 UG ELAST./G

## 2025-02-19 RX ORDER — ONDANSETRON 4 MG/1
4 TABLET, ORALLY DISINTEGRATING ORAL EVERY 8 HOURS PRN
Qty: 30 TABLET | Refills: 0 | Status: SHIPPED | OUTPATIENT
Start: 2025-02-19

## 2025-03-03 RX ORDER — ONDANSETRON 4 MG/1
4 TABLET, ORALLY DISINTEGRATING ORAL EVERY 8 HOURS PRN
Qty: 30 TABLET | Refills: 0 | Status: SHIPPED | OUTPATIENT
Start: 2025-03-03

## 2025-03-06 ENCOUNTER — HOSPITAL ENCOUNTER (OUTPATIENT)
Dept: NUCLEAR MEDICINE | Facility: HOSPITAL | Age: 50
Discharge: HOME | End: 2025-03-06
Payer: COMMERCIAL

## 2025-03-06 DIAGNOSIS — R11.2 NAUSEA AND VOMITING, UNSPECIFIED VOMITING TYPE: ICD-10-CM

## 2025-03-06 PROCEDURE — A9541 TC99M SULFUR COLLOID: HCPCS

## 2025-03-06 PROCEDURE — 34310000005 TECHNETIUM SULFUR COLLOID

## 2025-03-06 PROCEDURE — 78264 GASTRIC EMPTYING IMG STUDY: CPT

## 2025-03-06 RX ADMIN — TECHNETIUM TC 99M SULFUR COLLOID 1 DOSE: KIT at 10:47

## 2025-03-19 RX ORDER — ONDANSETRON 4 MG/1
4 TABLET, ORALLY DISINTEGRATING ORAL EVERY 8 HOURS PRN
Qty: 30 TABLET | Refills: 0 | Status: SHIPPED | OUTPATIENT
Start: 2025-03-19

## 2025-03-21 ENCOUNTER — TELEPHONE (OUTPATIENT)
Dept: GASTROENTEROLOGY | Facility: CLINIC | Age: 50
End: 2025-03-21
Payer: COMMERCIAL

## 2025-03-21 NOTE — TELEPHONE ENCOUNTER
PT WAS WAITING ON INFORMATION ON HER DIAGNOSIS OF GASTROPARESIS  SHE SAID SHE WAS INFORMED THAT NURSE WAS MAILING OUT INFO (DIET, FOODS TO AVOID, HOW TO MANAGE) ETC  SHE SAID IT WAS BEING MAILED VERSUS EMAILED(SHE DOES NOT HAVE AN ACTIVE EMAIL OR [employer])  VERIFIED HER MAILING ADDRESS  IT IS CORRECT    WOULD LIKE THE INFO SOON AS SHE IS GOOGLING  AND IS UNSURE IF WHAT SHE'S SEEING IS GOOD FOR HER WITH HER OTHER ISSUES SHE'S HAVING

## 2025-03-27 ENCOUNTER — ANESTHESIA EVENT (OUTPATIENT)
Dept: PERIOP | Facility: HOSPITAL | Age: 50
End: 2025-03-27
Payer: COMMERCIAL

## 2025-03-27 RX ORDER — DICYCLOMINE HYDROCHLORIDE 10 MG/1
10 CAPSULE ORAL 4 TIMES DAILY PRN
COMMUNITY

## 2025-03-27 RX ORDER — PROMETHAZINE HYDROCHLORIDE 12.5 MG/1
12.5 TABLET ORAL EVERY 6 HOURS PRN
Qty: 12 TABLET | Refills: 0 | Status: SHIPPED | OUTPATIENT
Start: 2025-03-27

## 2025-03-27 NOTE — PAT
Pt states that she has been following the 2 day prep instructions and was on clears since yesterday and followed the prep instructions for yesterday and into today 3/27    Was barely able to finish  the Mag Citrate for today and has been stooling since yesterday and is having yellow fluid  as her stool production.  Pt is asking if she MUST continue on to the Mirilax portion of the prep now that she is stooling yellow fluid   She is very nauseous and doesn't think she can tolerate another day of this    She has taken bentyl and Zofran Call  made to Pippa Tsai to inform provider

## 2025-03-28 ENCOUNTER — ANESTHESIA (OUTPATIENT)
Dept: PERIOP | Facility: HOSPITAL | Age: 50
End: 2025-03-28
Payer: COMMERCIAL

## 2025-03-28 ENCOUNTER — HOSPITAL ENCOUNTER (OUTPATIENT)
Facility: HOSPITAL | Age: 50
Setting detail: HOSPITAL OUTPATIENT SURGERY
Discharge: HOME | End: 2025-03-28
Attending: INTERNAL MEDICINE | Admitting: INTERNAL MEDICINE
Payer: COMMERCIAL

## 2025-03-28 VITALS
SYSTOLIC BLOOD PRESSURE: 156 MMHG | OXYGEN SATURATION: 97 % | BODY MASS INDEX: 29.59 KG/M2 | DIASTOLIC BLOOD PRESSURE: 90 MMHG | HEIGHT: 62 IN | TEMPERATURE: 98.6 F | RESPIRATION RATE: 20 BRPM | HEART RATE: 63 BPM | WEIGHT: 160.8 LBS

## 2025-03-28 DIAGNOSIS — R11.0 NAUSEA: ICD-10-CM

## 2025-03-28 DIAGNOSIS — K21.00 GASTROESOPHAGEAL REFLUX DISEASE WITH ESOPHAGITIS WITHOUT HEMORRHAGE: ICD-10-CM

## 2025-03-28 DIAGNOSIS — R19.5 ELEVATED FECAL CALPROTECTIN: ICD-10-CM

## 2025-03-28 PROCEDURE — 25810000003 LACTATED RINGERS PER 1000 ML

## 2025-03-28 PROCEDURE — 43239 EGD BIOPSY SINGLE/MULTIPLE: CPT | Performed by: INTERNAL MEDICINE

## 2025-03-28 PROCEDURE — 25010000002 LIDOCAINE 2% SOLUTION: Performed by: NURSE ANESTHETIST, CERTIFIED REGISTERED

## 2025-03-28 PROCEDURE — 45380 COLONOSCOPY AND BIOPSY: CPT | Performed by: INTERNAL MEDICINE

## 2025-03-28 PROCEDURE — 25010000002 PROPOFOL 200 MG/20ML EMULSION: Performed by: NURSE ANESTHETIST, CERTIFIED REGISTERED

## 2025-03-28 PROCEDURE — 88305 TISSUE EXAM BY PATHOLOGIST: CPT | Performed by: INTERNAL MEDICINE

## 2025-03-28 RX ORDER — LIDOCAINE HYDROCHLORIDE 10 MG/ML
0.5 INJECTION, SOLUTION EPIDURAL; INFILTRATION; INTRACAUDAL; PERINEURAL ONCE AS NEEDED
Status: DISCONTINUED | OUTPATIENT
Start: 2025-03-28 | End: 2025-03-28 | Stop reason: HOSPADM

## 2025-03-28 RX ORDER — ONDANSETRON 2 MG/ML
4 INJECTION INTRAMUSCULAR; INTRAVENOUS ONCE AS NEEDED
Status: DISCONTINUED | OUTPATIENT
Start: 2025-03-28 | End: 2025-03-28 | Stop reason: HOSPADM

## 2025-03-28 RX ORDER — SODIUM CHLORIDE, SODIUM LACTATE, POTASSIUM CHLORIDE, CALCIUM CHLORIDE 600; 310; 30; 20 MG/100ML; MG/100ML; MG/100ML; MG/100ML
9 INJECTION, SOLUTION INTRAVENOUS CONTINUOUS
Status: DISCONTINUED | OUTPATIENT
Start: 2025-03-28 | End: 2025-03-28 | Stop reason: HOSPADM

## 2025-03-28 RX ORDER — SODIUM CHLORIDE 0.9 % (FLUSH) 0.9 %
10 SYRINGE (ML) INJECTION EVERY 12 HOURS SCHEDULED
Status: DISCONTINUED | OUTPATIENT
Start: 2025-03-28 | End: 2025-03-28 | Stop reason: HOSPADM

## 2025-03-28 RX ORDER — SODIUM CHLORIDE, SODIUM LACTATE, POTASSIUM CHLORIDE, CALCIUM CHLORIDE 600; 310; 30; 20 MG/100ML; MG/100ML; MG/100ML; MG/100ML
100 INJECTION, SOLUTION INTRAVENOUS ONCE
Status: DISCONTINUED | OUTPATIENT
Start: 2025-03-28 | End: 2025-03-28 | Stop reason: HOSPADM

## 2025-03-28 RX ORDER — LIDOCAINE HYDROCHLORIDE 20 MG/ML
INJECTION, SOLUTION INFILTRATION; PERINEURAL AS NEEDED
Status: DISCONTINUED | OUTPATIENT
Start: 2025-03-28 | End: 2025-03-28 | Stop reason: SURG

## 2025-03-28 RX ORDER — MIDAZOLAM HYDROCHLORIDE 1 MG/ML
2 INJECTION, SOLUTION INTRAMUSCULAR; INTRAVENOUS ONCE
Status: DISCONTINUED | OUTPATIENT
Start: 2025-03-28 | End: 2025-03-28 | Stop reason: HOSPADM

## 2025-03-28 RX ORDER — SODIUM CHLORIDE 9 MG/ML
40 INJECTION, SOLUTION INTRAVENOUS AS NEEDED
Status: DISCONTINUED | OUTPATIENT
Start: 2025-03-28 | End: 2025-03-28 | Stop reason: HOSPADM

## 2025-03-28 RX ORDER — SODIUM CHLORIDE 0.9 % (FLUSH) 0.9 %
10 SYRINGE (ML) INJECTION AS NEEDED
Status: DISCONTINUED | OUTPATIENT
Start: 2025-03-28 | End: 2025-03-28 | Stop reason: HOSPADM

## 2025-03-28 RX ORDER — PROPOFOL 10 MG/ML
INJECTION, EMULSION INTRAVENOUS AS NEEDED
Status: DISCONTINUED | OUTPATIENT
Start: 2025-03-28 | End: 2025-03-28 | Stop reason: SURG

## 2025-03-28 RX ADMIN — PROPOFOL 50 MG: 10 INJECTION, EMULSION INTRAVENOUS at 14:12

## 2025-03-28 RX ADMIN — PROPOFOL 50 MG: 10 INJECTION, EMULSION INTRAVENOUS at 14:26

## 2025-03-28 RX ADMIN — PROPOFOL 50 MG: 10 INJECTION, EMULSION INTRAVENOUS at 14:21

## 2025-03-28 RX ADMIN — PROPOFOL 50 MG: 10 INJECTION, EMULSION INTRAVENOUS at 14:08

## 2025-03-28 RX ADMIN — PROPOFOL 50 MG: 10 INJECTION, EMULSION INTRAVENOUS at 14:16

## 2025-03-28 RX ADMIN — SODIUM CHLORIDE, SODIUM LACTATE, POTASSIUM CHLORIDE, AND CALCIUM CHLORIDE 9 ML/HR: .6; .31; .03; .02 INJECTION, SOLUTION INTRAVENOUS at 12:48

## 2025-03-28 RX ADMIN — LIDOCAINE HYDROCHLORIDE 100 MG: 20 INJECTION, SOLUTION INFILTRATION; PERINEURAL at 14:02

## 2025-03-28 RX ADMIN — PROPOFOL 100 MG: 10 INJECTION, EMULSION INTRAVENOUS at 14:05

## 2025-03-28 NOTE — ANESTHESIA PREPROCEDURE EVALUATION
Anesthesia Evaluation     Patient summary reviewed and Nursing notes reviewed   no history of anesthetic complications:   NPO Solid Status: > 6 hours  NPO Liquid Status: > 8 hours           Airway   Mallampati: II  TM distance: >3 FB  Neck ROM: full  No difficulty expected  Dental    (+) edentulous    Pulmonary - normal exam    breath sounds clear to auscultation  (+) a smoker Current, cigarettes, COPD (used inhalers today) mild, asthma,  Cardiovascular   Exercise tolerance: good (4-7 METS)    Rhythm: regular  Rate: normal    (+) hypertension well controlled less than 2 medications  (-) dysrhythmias      Neuro/Psych  (+) psychiatric history Anxiety, Depression and PTSD  (-) headaches  GI/Hepatic/Renal/Endo    (+) GERD (gastroparesis) poorly controlled    Musculoskeletal (-) negative ROS    (+) neck pain  Abdominal    Substance History - negative use     OB/GYN          Other - negative ROS                         Anesthesia Plan    ASA 3     MAC     intravenous induction     Anesthetic plan, risks, benefits, and alternatives have been provided, discussed and informed consent has been obtained with: patient.  Pre-procedure education provided  Use of blood products discussed with patient  Consented to blood products.    Plan discussed with CRNA.        CODE STATUS:

## 2025-03-28 NOTE — BRIEF OP NOTE
ESOPHAGOGASTRODUODENOSCOPY WITH BIOPSY, COLONOSCOPY WITH POLYPECTOMY  Progress Note    Cara Ricketts  3/28/2025    Pre-op Diagnosis:   Gastroesophageal reflux disease with esophagitis without hemorrhage [K21.00]  Nausea [R11.0]  Elevated fecal calprotectin [R19.5]       Post-Op Diagnosis Codes:     * Gastroesophageal reflux disease with esophagitis without hemorrhage [K21.00]     * Nausea [R11.0]     * Elevated fecal calprotectin [R19.5]     * Gastritis [K29.70]     * Reflux esophagitis [K21.00]     * Colon polyps [K63.5]    Procedure(s):      Procedure(s):  ESOPHAGOGASTRODUODENOSCOPY WITH BIOPSY  COLONOSCOPY WITH POLYPECTOMY              Surgeon(s):  Hari Sepulveda MD    Anesthesia: Monitored Anesthesia Care    Staff:   Circulator: Siobhan Wood RN; Marietta Espana RN  Scrub Person: Yaquelin García  Scrub Person Extra: Aleshia Serrano       Estimated Blood Loss: none    Urine Voided: * No values recorded between 3/28/2025  1:59 PM and 3/28/2025  2:31 PM *    Specimens:                Specimens       ID Source Type Tests Collected By Collected At Frozen?    A Gastric, Antrum Tissue TISSUE PATHOLOGY EXAM   Hari Sepulveda MD 3/28/25 1412     Description: Biopsies    This specimen was not marked as sent.    B Esophagus, Distal Tissue TISSUE PATHOLOGY EXAM   Hari Sepulveda MD 3/28/25 1415     Description: Biopsies    This specimen was not marked as sent.    C Large Intestine, Sigmoid Colon Polyp TISSUE PATHOLOGY EXAM   Hari Sepulveda MD 3/28/25 1425     Description: Sigmoid polyp x 2    This specimen was not marked as sent.              Drains: * No LDAs found *    Findings: Normal Duodenum  Mild Gastritis-Biopsy  Reflux Esophagitis-Biopsy    Colon to Cecum fair/Poor Prep  [id number]      Complications: none          Hari Sepulveda MD     Date: 3/28/2025  Time: 14:33 EDT

## 2025-03-28 NOTE — ANESTHESIA POSTPROCEDURE EVALUATION
Patient: Cara Ricketts    Procedure Summary       Date: 03/28/25 Room / Location: [facility] ENDOSCOPY 1 /  [facility]    Anesthesia Start: 1359 Anesthesia Stop: 1435    Procedures:       ESOPHAGOGASTRODUODENOSCOPY WITH BIOPSY (Esophagus)      COLONOSCOPY WITH POLYPECTOMY Diagnosis:       Gastroesophageal reflux disease with esophagitis without hemorrhage      Nausea      Elevated fecal calprotectin      Gastritis      Reflux esophagitis      Colon polyps      (Gastroesophageal reflux disease with esophagitis without hemorrhage [K21.00])      (Nausea [R11.0])      (Elevated fecal calprotectin [R19.5])    Surgeons: Hari Sepulveda MD Provider: Karon Paula CRNA    Anesthesia Type: MAC ASA Status: 3            Anesthesia Type: MAC    Vitals  Vitals Value Taken Time   /98 03/28/25 15:00   Temp     Pulse 56 03/28/25 15:03   Resp 15 03/28/25 14:45   SpO2 98 % 03/28/25 15:03   Vitals shown include unfiled device data.        Post Anesthesia Care and Evaluation    Patient location during evaluation: bedside  Patient participation: complete - patient participated  Level of consciousness: awake and alert  Pain score: 0  Pain management: adequate    Airway patency: patent  Anesthetic complications: No anesthetic complications  PONV Status: none  Cardiovascular status: acceptable  Respiratory status: acceptable  Hydration status: acceptable  No anesthesia care post op

## 2025-04-01 LAB
CYTO UR: NORMAL
LAB AP CASE REPORT: NORMAL
PATH REPORT.FINAL DX SPEC: NORMAL
PATH REPORT.GROSS SPEC: NORMAL

## 2025-04-07 DIAGNOSIS — K58.1 IRRITABLE BOWEL SYNDROME WITH CONSTIPATION: ICD-10-CM

## 2025-04-07 DIAGNOSIS — R10.9 ABDOMINAL CRAMPING: ICD-10-CM

## 2025-04-08 RX ORDER — VONOPRAZAN FUMARATE 26.72 MG/1
20 TABLET ORAL DAILY
Qty: 30 TABLET | Refills: 2 | Status: SHIPPED | OUTPATIENT
Start: 2025-04-08

## 2025-04-08 RX ORDER — HYOSCYAMINE SULFATE 0.38 MG/1
TABLET, EXTENDED RELEASE ORAL
Qty: 30 TABLET | Refills: 0 | Status: SHIPPED | OUTPATIENT
Start: 2025-04-08

## 2025-04-08 RX ORDER — PROMETHAZINE HYDROCHLORIDE 12.5 MG/1
TABLET ORAL
Qty: 12 TABLET | Refills: 0 | Status: SHIPPED | OUTPATIENT
Start: 2025-04-08

## 2025-04-09 ENCOUNTER — TELEPHONE (OUTPATIENT)
Dept: GASTROENTEROLOGY | Facility: CLINIC | Age: 50
End: 2025-04-09
Payer: COMMERCIAL

## 2025-04-09 NOTE — TELEPHONE ENCOUNTER
Like we discussed, use ibuprofen and Tylenol in addition to ice versus heat or alternating ice and heat for management of pain and this muscular strain.  Avoid any activity for the next several days that exacerbates her pain.  Return to the emergency department if symptoms significantly worsen.   PA approval scanned into media.

## 2025-04-09 NOTE — TELEPHONE ENCOUNTER
Pt requesting for RX for Diflucan unclear exactly if medication. She will review when she gets home. Reviewed current list and if she needs Diflucan will contact her PCP

## 2025-04-09 NOTE — TELEPHONE ENCOUNTER
The request has been approved. The authorization is effective from 04/09/2025 to 10/09/2025, as long as the member is enrolled in their current health plan. A written notification letter will follow with additional details.  Effective Date: 4/9/2025  Authorization Expiration Date: 10/9/2025

## 2025-04-09 NOTE — TELEPHONE ENCOUNTER
SHE CALLED BACK STATING THE PHARM TOLD HER TO CALL HERE AND REQUEST PA (EXPLAINED IT WAS APPROVED)   SHE HAS QUESTIONS RE OTHER MEDS  REQUESTS A CALL BACK

## 2025-04-16 RX ORDER — ONDANSETRON 4 MG/1
4 TABLET, ORALLY DISINTEGRATING ORAL EVERY 8 HOURS PRN
Qty: 30 TABLET | Refills: 0 | Status: SHIPPED | OUTPATIENT
Start: 2025-04-16

## 2025-05-02 ENCOUNTER — TELEPHONE (OUTPATIENT)
Dept: GASTROENTEROLOGY | Facility: CLINIC | Age: 50
End: 2025-05-02
Payer: COMMERCIAL

## 2025-05-08 RX ORDER — ONDANSETRON 4 MG/1
4 TABLET, ORALLY DISINTEGRATING ORAL EVERY 8 HOURS PRN
Qty: 30 TABLET | Refills: 0 | Status: SHIPPED | OUTPATIENT
Start: 2025-05-08

## 2025-05-08 RX ORDER — DICYCLOMINE HYDROCHLORIDE 10 MG/1
CAPSULE ORAL
Qty: 120 CAPSULE | Refills: 0 | Status: SHIPPED | OUTPATIENT
Start: 2025-05-08

## 2025-05-12 ENCOUNTER — TELEPHONE (OUTPATIENT)
Dept: GASTROENTEROLOGY | Facility: CLINIC | Age: 50
End: 2025-05-12

## 2025-05-12 ENCOUNTER — OFFICE VISIT (OUTPATIENT)
Dept: GASTROENTEROLOGY | Facility: CLINIC | Age: 50
End: 2025-05-12
Payer: COMMERCIAL

## 2025-05-12 VITALS
HEIGHT: 62 IN | WEIGHT: 159.6 LBS | DIASTOLIC BLOOD PRESSURE: 110 MMHG | BODY MASS INDEX: 29.37 KG/M2 | SYSTOLIC BLOOD PRESSURE: 186 MMHG

## 2025-05-12 DIAGNOSIS — K21.00 GASTROESOPHAGEAL REFLUX DISEASE WITH ESOPHAGITIS WITHOUT HEMORRHAGE: ICD-10-CM

## 2025-05-12 DIAGNOSIS — R11.0 NAUSEA: ICD-10-CM

## 2025-05-12 DIAGNOSIS — K31.84 GASTROPARESIS: ICD-10-CM

## 2025-05-12 DIAGNOSIS — K58.1 IRRITABLE BOWEL SYNDROME WITH CONSTIPATION: Primary | ICD-10-CM

## 2025-05-12 RX ORDER — ESTRADIOL 0.1 MG/G
2 CREAM VAGINAL DAILY
COMMUNITY
Start: 2025-03-24

## 2025-05-12 RX ORDER — PROMETHAZINE HYDROCHLORIDE 12.5 MG/1
12.5 TABLET ORAL EVERY 6 HOURS PRN
Qty: 30 TABLET | Refills: 0 | Status: SHIPPED | OUTPATIENT
Start: 2025-05-12

## 2025-05-12 RX ORDER — ERYTHROMYCIN ETHYLSUCCINATE 200 MG/5ML
125 SUSPENSION ORAL
Qty: 372 ML | Refills: 3 | Status: SHIPPED | OUTPATIENT
Start: 2025-05-12

## 2025-05-12 RX ORDER — LUBIPROSTONE 24 UG/1
24 CAPSULE ORAL 2 TIMES DAILY WITH MEALS
Qty: 60 CAPSULE | Refills: 11 | Status: SHIPPED | OUTPATIENT
Start: 2025-05-12

## 2025-05-12 RX ORDER — PROMETHAZINE HYDROCHLORIDE 12.5 MG/1
12.5 TABLET ORAL EVERY 6 HOURS PRN
Qty: 30 TABLET | Refills: 0 | Status: SHIPPED | OUTPATIENT
Start: 2025-05-12 | End: 2025-05-12 | Stop reason: SDUPTHER

## 2025-05-12 RX ORDER — ERYTHROMYCIN 250 MG/1
125 TABLET, COATED ORAL
Qty: 60 TABLET | Refills: 3 | Status: SHIPPED | OUTPATIENT
Start: 2025-05-12 | End: 2025-05-12

## 2025-05-12 RX ORDER — PROMETHAZINE HYDROCHLORIDE 12.5 MG/1
TABLET ORAL
Qty: 12 TABLET | Refills: 0 | OUTPATIENT
Start: 2025-05-12

## 2025-05-12 NOTE — PROGRESS NOTES
PATIENT INFORMATION  Cara Ricketts       - 1975    CHIEF COMPLAINT  Chief Complaint   Patient presents with    Heartburn    incidental Pancreatic metaplasia    Abdominal Pain    Constipation    Diarrhea    Vomiting    Nausea       HISTORY OF PRESENT ILLNESS    Here today for EGD and Colonoscopy follow-up    3/28/25 EGD with chemical gastritis, chronic reflux, pancreatic metaplasia, negative for HP, barretts  3/28/25 Colon Poor prep 0/2 adenomas, normal  Reviewed path and images with patient    Referral sent at [facility] for pelvic floor PT. She reports she is taking 8 mcg amitiza BID and then skip a few days with no BM and start with hard stools and then diarrhea for a few days. Hemorrhoids flared yesterday. Activia made her feel bad the next day.    GERD: Voquezna 20 mg daily. She has not wanted to eat because she has felt really sick. Vomits most days. HB and indigestion.     3/6/25 GES 12%  2022 EGD with erosive gastritis, no H Pylori  2022 Colon with 1 HP polyp and poor prep, was unable to keep prep down.  8/10/2020 ERCP with stent removal   Cholecystectomy persistently felt bad until ERCP 10 yrs laterReferral sent at [facility] for [facility] PT. At [facility] started amitiza 8 mcg BID which she is non compliant with.    Her BP is uncontrolled, she reports its always high, she has minipress at home she is not taking, clonidine she takes PRN when she feels its high. She will take meds when she gets home and then recheck it. Reviewed severe risks with uncontrolled BP.        REVIEWED PERTINENT RESULTS/ LABS  Lab Results   Component Value Date    CASEREPORT  2025     Surgical Pathology Report                         Case: [id number]                                  Authorizing Provider:  Hari Sepulveda        Collected:           2025 02:12 PM                                 MD Juliet                                                                   Ordering Location:     [facility]  [facility]   Received:            03/28/2025 04:36 PM                                 OR                                                                           Pathologist:           Armani Tinsley MD                                                         Specimens:   1) - Gastric, Antrum, Biopsies                                                                      2) - Esophagus, Distal, Biopsies                                                                    3) - Large Intestine, Sigmoid Colon, Sigmoid polyp x 2                                     FINALDX  03/28/2025     1.  Stomach, antrum, biopsy: Benign gastric mucosa with   -A. Mild chronic inflammation    -B. No intestinal metaplasia.   -C. No Helicobacter pylori.    -D.  Focal changes suggesting chemical gastritis   -E.  Focal erosion    2.  Esophagus, distal, biopsy: Benign squamous and gastric mucosa with-   -A. Chronic inflammation in the gastric mucosa.   -B. No intestinal metaplasia.   -C. No Helicobacter pylori.  -D.  Pancreatic metaplasia (nonspecific pathologic findings)    3.  Colon, sigmoid, biopsy: Hyperplastic polyps           Lab Results   Component Value Date    HGB 15.1 01/29/2025    MCV 98.1 (H) 01/29/2025     01/29/2025    ALT 20 01/29/2025    AST 22 01/29/2025    INR 1.1 06/24/2020      No results found.    REVIEW OF SYSTEMS  Review of Systems      ACTIVE PROBLEMS  Patient Active Problem List    Diagnosis     Nausea [R11.0]     Elevated fecal calprotectin [R19.5]     RUQ pain [R10.11]     History of colon polyps [Z86.0100]     History of gallstones [Z87.19]     Decreased body weight [R63.4]     Abdominal pain, left lower quadrant [R10.32]     D (diarrhea) [R19.7]     Emesis [R11.10]     Acid reflux [K21.9]     BP (high blood pressure) [I10]     N&V (nausea and vomiting) [R11.2]          PAST MEDICAL HISTORY  Past Medical History:   Diagnosis Date    Anxiety and depression     Asthma     COPD (chronic obstructive pulmonary  disease)     Gallstones     Hypertension     TMJ (dislocation of temporomandibular joint)          SURGICAL HISTORY  Past Surgical History:   Procedure Laterality Date    COLONOSCOPY      COLONOSCOPY W/ POLYPECTOMY N/A 6/28/2022    Procedure: Colonoscopy with polypectomy;  Surgeon: Zari Burnett DO;  Location: [facility] OR;  Service: Gastroenterology;  Laterality: N/A;  poor prep  rectal polyps x2 (hot snare x1)    COLONOSCOPY W/ POLYPECTOMY N/A 3/28/2025    Procedure: COLONOSCOPY WITH POLYPECTOMY;  Surgeon: Hari Sepulveda MD;  Location: [facility] OR;  Service: Gastroenterology;  Laterality: N/A;  Poor Prep; Sigmoid polyp x 2;    EAR TUBES      ENDOSCOPY      ENDOSCOPY N/A 6/28/2022    Procedure: Esophagogastroduodenoscopy with biopsy;  Surgeon: Zari Burnett DO;  Location: [facility] OR;  Service: Gastroenterology;  Laterality: N/A;  GREGG test  erosive gastritis    ENDOSCOPY N/A 3/28/2025    Procedure: ESOPHAGOGASTRODUODENOSCOPY WITH BIOPSY;  Surgeon: Hari Sepulveda MD;  Location: [facility] OR;  Service: Gastroenterology;  Laterality: N/A;  Mild gastritis; Reflux esophagitis; Biopsies- gastric, distal esophagus    ERCP W/ PLASTIC STENT PLACEMENT      HYSTEROSCOPY ENDOMETRIAL ABLATION      LAPAROSCOPIC CHOLECYSTECTOMY      TUBAL ABDOMINAL LIGATION           FAMILY HISTORY  Family History   Problem Relation Age of Onset    Hypertension Father     Alzheimer's disease Paternal Grandmother     Emphysema Paternal Grandfather     Malig Hyperthermia Neg Hx          SOCIAL HISTORY  Social History     Occupational History    Not on file   Tobacco Use    Smoking status: Every Day     Current packs/day: 1.00     Types: Cigarettes     Passive exposure: Current    Smokeless tobacco: Never    Tobacco comments:     NA  instructed not to smoke pre procedure  3/27/2025   Vaping Use    Vaping status: Never Used   Substance and Sexual Activity    Alcohol use: Not Currently    Drug use: No    Sexual activity:  Yes     Comment: e-sure, endometrial ablation         CURRENT MEDICATIONS    Current Outpatient Medications:     albuterol sulfate  (90 Base) MCG/ACT inhaler, Inhale 2 puffs., Disp: , Rfl:     cloNIDine (CATAPRES) 0.1 MG tablet, Take 1 tablet by mouth Every 6 (Six) Hours As Needed for High Blood Pressure., Disp: , Rfl:     dicyclomine (BENTYL) 10 MG capsule, TAKE 1 CAPSULE BY MOUTH 4 TIMES DAILY AS NEEDED FOR  ABDOMINAL  CRAMPING, Disp: 120 capsule, Rfl: 0    folic acid (FOLVITE) 1 MG tablet, Take 1 tablet by mouth Daily., Disp: , Rfl:     Gemtesa 75 MG tablet, Take 1 tablet by mouth Daily., Disp: , Rfl:     hyoscyamine (LEVBID) 0.375 MG 12 hr tablet, TAKE 1 TABLET BY MOUTH EVERY 12 HOURS AS NEEDED FOR CRAMPS, Disp: 30 tablet, Rfl: 0    loratadine (CLARITIN) 10 MG tablet, Take 1 tablet by mouth Daily., Disp: , Rfl:     montelukast (SINGULAIR) 10 MG tablet, Take 1 tablet by mouth every night at bedtime., Disp: , Rfl:     ondansetron ODT (ZOFRAN-ODT) 4 MG disintegrating tablet, DISSOLVE 1 TABLET IN MOUTH EVERY 8 HOURS AS NEEDED FOR NAUSEA AND VOMITING, Disp: 30 tablet, Rfl: 0    prazosin (MINIPRESS) 1 MG capsule, Take 1 capsule by mouth 2 (Two) Times a Day., Disp: , Rfl:     promethazine (PHENERGAN) 12.5 MG tablet, Take 1 tablet by mouth Every 6 (Six) Hours As Needed for Nausea or Vomiting., Disp: 30 tablet, Rfl: 0    sertraline (ZOLOFT) 100 MG tablet, , Disp: , Rfl:     traZODone (DESYREL) 100 MG tablet, Take 2 tablets by mouth every night at bedtime., Disp: , Rfl:     Vitamin D3 Maximum Strength 125 MCG (5000 UT) capsule capsule, Take 1 capsule by mouth Daily., Disp: , Rfl:     Vonoprazan Fumarate (Voquezna) 20 MG tablet, Take 1 tablet by mouth once daily, Disp: 30 tablet, Rfl: 2    busPIRone (BUSPAR) 7.5 MG tablet, Take 1 tablet by mouth 3 (Three) Times a Day. (Patient not taking: Reported on 5/12/2025), Disp: , Rfl:     erythromycin base (E-MYCIN) 250 MG tablet, Take 0.5 tablets by mouth 4 (Four) Times a  "Day Before Meals & at Bedtime., Disp: 60 tablet, Rfl: 3    estradiol (ESTRACE) 0.1 MG/GM vaginal cream, Insert 2 g into the vagina Daily. (Patient not taking: Reported on 5/12/2025), Disp: , Rfl:     lubiprostone (Amitiza) 24 MCG capsule, Take 1 capsule by mouth 2 (Two) Times a Day With Meals., Disp: 60 capsule, Rfl: 11    methocarbamol (ROBAXIN) 500 MG tablet, TAKE 2 TABLETS BY MOUTH 4 TIMES DAILY AS NEEDED (Patient not taking: Reported on 5/12/2025), Disp: , Rfl:     ALLERGIES  Amoxicillin, Milk-related compounds, and Penicillins    VITALS  Vitals:    05/12/25 0803   BP: (!) 186/110   BP Location: Left arm   Patient Position: Sitting   Cuff Size: Adult   Weight: 72.4 kg (159 lb 9.6 oz)   Height: 157.5 cm (62.01\")       PHYSICAL EXAM  Debilities/Disabilities Identified: None  Emotional Behavior: Appropriate  Wt Readings from Last 3 Encounters:   05/12/25 72.4 kg (159 lb 9.6 oz)   03/28/25 72.9 kg (160 lb 12.8 oz)   02/05/25 77.2 kg (170 lb 3.2 oz)     Ht Readings from Last 1 Encounters:   05/12/25 157.5 cm (62.01\")     Body mass index is 29.18 kg/m².  Physical Exam  Constitutional:       General: She is not in acute distress.     Appearance: Normal appearance. She is not ill-appearing.   HENT:      Head: Normocephalic and atraumatic.      Mouth/Throat:      Mouth: Mucous membranes are moist.      Pharynx: No posterior oropharyngeal erythema.   Eyes:      General: No scleral icterus.  Cardiovascular:      Rate and Rhythm: Normal rate and regular rhythm.      Heart sounds: Normal heart sounds.   Pulmonary:      Effort: Pulmonary effort is normal.      Breath sounds: Normal breath sounds.   Abdominal:      General: Abdomen is flat. Bowel sounds are normal. There is no distension.      Palpations: Abdomen is soft. There is no mass.      Tenderness: There is no abdominal tenderness. There is no guarding or rebound. Negative signs include Trent's sign.      Hernia: No hernia is present.   Musculoskeletal:      Cervical " back: Neck supple.   Skin:     General: Skin is warm.      Capillary Refill: Capillary refill takes less than 2 seconds.   Neurological:      General: No focal deficit present.      Mental Status: She is alert and oriented to person, place, and time.   Psychiatric:         Mood and Affect: Mood normal.         Behavior: Behavior normal.         Thought Content: Thought content normal.         Judgment: Judgment normal.         CLINICAL DATA REVIEWED   reviewed previous lab results and integrated with today's visit, reviewed notes from other physicians and/or last GI encounter, reviewed previous endoscopy results and available photos, reviewed surgical pathology results from previous biopsies      ASSESSMENT  Diagnoses and all orders for this visit:    Irritable bowel syndrome with constipation  -     lubiprostone (Amitiza) 24 MCG capsule; Take 1 capsule by mouth 2 (Two) Times a Day With Meals.    Gastroparesis  -     erythromycin base (E-MYCIN) 250 MG tablet; Take 0.5 tablets by mouth 4 (Four) Times a Day Before Meals & at Bedtime.    Nausea    Gastroesophageal reflux disease with esophagitis without hemorrhage    Other orders  -     estradiol (ESTRACE) 0.1 MG/GM vaginal cream; Insert 2 g into the vagina Daily. (Patient not taking: Reported on 5/12/2025)  -     promethazine (PHENERGAN) 12.5 MG tablet; Take 1 tablet by mouth Every 6 (Six) Hours As Needed for Nausea or Vomiting.          PLAN    GP: Reviewed diet with patient, stick with liquid diet for now, will try emycin tablets  Nausea: PRN zofran, phenergan  IBS-C: Increase amitiza to 24 mcg BID with food, miralax on nights with no BM   Reviewed risks of uncontrolled HTN and HTN emergency, pt will go home to take BP meds now and check BP again to ensure improvement    Return in about 3 months (around 8/12/2025).    I have discussed the above plan with the patient.  They verbalize understanding and are in agreement with the plan.  They have been advised to contact  the office for any questions, concerns, or changes related to their health.

## 2025-05-12 NOTE — TELEPHONE ENCOUNTER
This request has not been approved. Based on the information we received, you did not meet the specific rule(s) needed to approve this request. In some cases, the requested drug or alternatives offered may have other guideline rules that need to be met. Our guideline named ANTIBIOTICS - MACROLIDES requires ONE of the following rule(s) be met for approval: A. The request is for completion of a course of therapy that started during a hospital or healthcare facility stay. B. The member's infection is caused by an organism resistant [no response to] to medications not requiring prior authorization. C. The member had a trial and failure [drug did not work] of at least 3 days within the past 30 days, allergy, contraindication [harmful for] (including potential drug-drug interactions with other medications) or intolerance of [side effect] to 1 preferred agent: azithromycin, clarithromycin tablets/suspension, E.E.S. suspension 200mg/5mL (brand only), Reinaldo-Tab, erythromycin base capsules This is why your request is denied. Please work with your doctor to use a different medication or get us more information if it will allow us to approve this request. A written notification letter will follow with additional details.      PA denied: alternative to send to [facility] [place] emycin base: 35.48

## 2025-05-12 NOTE — TELEPHONE ENCOUNTER
Attempt to reach pt regarding Denial    [facility] Tablets were denied insurance prefers for suspension RX sent to pharmacy

## 2025-05-12 NOTE — TELEPHONE ENCOUNTER
PA sent through [facility] for Emycin for GP    (Has the patient had a trial and failure of at least 3 days within the last 30 days, allergy, contraindication (including potential drug-drug interactions with other medications), intolerance of or evidence of organism resistance to ONE preferred agent (azithromycin, clarithromycin tablets/suspension, E.E.S. suspension 200mg/5mL (brand only), Reinaldo-Tab, erythromycin base capsules DR)?*)

## 2025-05-14 ENCOUNTER — OFFICE VISIT (OUTPATIENT)
Dept: OBSTETRICS AND GYNECOLOGY | Facility: CLINIC | Age: 50
End: 2025-05-14
Payer: COMMERCIAL

## 2025-05-14 VITALS
SYSTOLIC BLOOD PRESSURE: 132 MMHG | HEIGHT: 62 IN | WEIGHT: 160 LBS | BODY MASS INDEX: 29.44 KG/M2 | DIASTOLIC BLOOD PRESSURE: 76 MMHG

## 2025-05-14 DIAGNOSIS — Z01.419 PAP SMEAR, AS PART OF ROUTINE GYNECOLOGICAL EXAMINATION: ICD-10-CM

## 2025-05-14 DIAGNOSIS — Z01.419 ROUTINE GYNECOLOGICAL EXAMINATION: Primary | ICD-10-CM

## 2025-05-14 DIAGNOSIS — Z11.51 SPECIAL SCREENING EXAMINATION FOR HUMAN PAPILLOMAVIRUS (HPV): ICD-10-CM

## 2025-05-14 LAB
BILIRUB BLD-MCNC: NEGATIVE MG/DL
CLARITY, POC: CLEAR
COLOR UR: YELLOW
GLUCOSE UR STRIP-MCNC: NEGATIVE MG/DL
KETONES UR QL: NEGATIVE
LEUKOCYTE EST, POC: NEGATIVE
NITRITE UR-MCNC: NEGATIVE MG/ML
PH UR: 6 [PH] (ref 5–8)
PROT UR STRIP-MCNC: NEGATIVE MG/DL
RBC # UR STRIP: NEGATIVE /UL
SP GR UR: 1.02 (ref 1–1.03)
UROBILINOGEN UR QL: NORMAL

## 2025-05-14 NOTE — PROGRESS NOTES
GYN Annual Exam     CC- Here for annual exam.     Cara Ricketts is a 50 y.o. female who presents for annual well woman exam. Periods are absent.     OB History    No obstetric history on file.         Current contraception: tubal ligation  History of abnormal Pap smear: no  Family history of uterine, colon or ovarian cancer: no  History of abnormal mammogram: no  Family history of breast cancer: yes - aunt  Last Pap : 2021    Past Medical History:   Diagnosis Date    Alcoholism     Anemia     Anxiety and depression     Asthma     COPD (chronic obstructive pulmonary disease)     Ectopic pregnancy     Gallstones     Hyperlipidemia     Hypertension     Migraine     Multiple gestation 4-26-96    Ovarian cyst     PONV (postoperative nausea and vomiting)     Substance abuse     TMJ (dislocation of temporomandibular joint)     Urinary tract infection        Past Surgical History:   Procedure Laterality Date    COLONOSCOPY      COLONOSCOPY W/ POLYPECTOMY N/A 6/28/2022    Procedure: Colonoscopy with polypectomy;  Surgeon: Zari Burnett DO;  Location: [facility];  Service: Gastroenterology;  Laterality: N/A;  poor prep  rectal polyps x2 (hot snare x1)    COLONOSCOPY W/ POLYPECTOMY N/A 3/28/2025    Procedure: COLONOSCOPY WITH POLYPECTOMY;  Surgeon: Hari Sepulveda MD;  Location: [facility] OR;  Service: Gastroenterology;  Laterality: N/A;  Poor Prep; Sigmoid polyp x 2;    EAR TUBES      ENDOSCOPY      ENDOSCOPY N/A 6/28/2022    Procedure: Esophagogastroduodenoscopy with biopsy;  Surgeon: Zari Burnett DO;  Location: [facility] OR;  Service: Gastroenterology;  Laterality: N/A;  GREGG test  erosive gastritis    ENDOSCOPY N/A 3/28/2025    Procedure: ESOPHAGOGASTRODUODENOSCOPY WITH BIOPSY;  Surgeon: Hari Sepulveda MD;  Location: [facility] OR;  Service: Gastroenterology;  Laterality: N/A;  Mild gastritis; Reflux esophagitis; Biopsies- gastric, distal esophagus    ERCP W/ PLASTIC STENT PLACEMENT       HYSTEROSCOPY ENDOMETRIAL ABLATION      LAPAROSCOPIC CHOLECYSTECTOMY      TUBAL ABDOMINAL LIGATION           Current Outpatient Medications:     albuterol sulfate  (90 Base) MCG/ACT inhaler, Inhale 2 puffs., Disp: , Rfl:     busPIRone (BUSPAR) 7.5 MG tablet, Take 1 tablet by mouth 3 (Three) Times a Day. (Patient not taking: Reported on 5/12/2025), Disp: , Rfl:     cloNIDine (CATAPRES) 0.1 MG tablet, Take 1 tablet by mouth Every 6 (Six) Hours As Needed for High Blood Pressure., Disp: , Rfl:     dicyclomine (BENTYL) 10 MG capsule, TAKE 1 CAPSULE BY MOUTH 4 TIMES DAILY AS NEEDED FOR  ABDOMINAL  CRAMPING, Disp: 120 capsule, Rfl: 0    erythromycin ethylsuccinate 200 MG/5ML suspension, Take 3.1 mL by mouth 4 (Four) Times a Day With Meals & at Bedtime., Disp: 372 mL, Rfl: 3    estradiol (ESTRACE) 0.1 MG/GM vaginal cream, Insert 2 g into the vagina Daily. (Patient not taking: Reported on 5/12/2025), Disp: , Rfl:     folic acid (FOLVITE) 1 MG tablet, Take 1 tablet by mouth Daily., Disp: , Rfl:     Gemtesa 75 MG tablet, Take 1 tablet by mouth Daily., Disp: , Rfl:     hyoscyamine (LEVBID) 0.375 MG 12 hr tablet, TAKE 1 TABLET BY MOUTH EVERY 12 HOURS AS NEEDED FOR CRAMPS, Disp: 30 tablet, Rfl: 0    loratadine (CLARITIN) 10 MG tablet, Take 1 tablet by mouth Daily., Disp: , Rfl:     lubiprostone (Amitiza) 24 MCG capsule, Take 1 capsule by mouth 2 (Two) Times a Day With Meals., Disp: 60 capsule, Rfl: 11    methocarbamol (ROBAXIN) 500 MG tablet, TAKE 2 TABLETS BY MOUTH 4 TIMES DAILY AS NEEDED (Patient not taking: Reported on 5/12/2025), Disp: , Rfl:     montelukast (SINGULAIR) 10 MG tablet, Take 1 tablet by mouth every night at bedtime., Disp: , Rfl:     ondansetron ODT (ZOFRAN-ODT) 4 MG disintegrating tablet, DISSOLVE 1 TABLET IN MOUTH EVERY 8 HOURS AS NEEDED FOR NAUSEA AND VOMITING, Disp: 30 tablet, Rfl: 0    prazosin (MINIPRESS) 1 MG capsule, Take 1 capsule by mouth 2 (Two) Times a Day., Disp: , Rfl:     promethazine  "(PHENERGAN) 12.5 MG tablet, Take 1 tablet by mouth Every 6 (Six) Hours As Needed for Nausea or Vomiting., Disp: 30 tablet, Rfl: 0    sertraline (ZOLOFT) 100 MG tablet, , Disp: , Rfl:     traZODone (DESYREL) 100 MG tablet, Take 2 tablets by mouth every night at bedtime., Disp: , Rfl:     Vitamin D3 Maximum Strength 125 MCG (5000 UT) capsule capsule, Take 1 capsule by mouth Daily., Disp: , Rfl:     Vonoprazan Fumarate (Voquezna) 20 MG tablet, Take 1 tablet by mouth once daily, Disp: 30 tablet, Rfl: 2    Allergies   Allergen Reactions    Amoxicillin Hives    Milk-Related Compounds Nausea Only    Penicillins Hives       Social History     Tobacco Use    Smoking status: Every Day     Current packs/day: 1.00     Average packs/day: 1 pack/day for 32.0 years (32.0 ttl pk-yrs)     Types: Cigarettes     Start date: 5/7/1993     Passive exposure: Current    Smokeless tobacco: Never    Tobacco comments:     NA  instructed not to smoke pre procedure  3/27/2025   Vaping Use    Vaping status: Never Used   Substance Use Topics    Alcohol use: Not Currently    Drug use: No         Family History   Problem Relation Age of Onset    Hypertension Father     Alzheimer's disease Paternal Grandmother     Emphysema Paternal Grandfather     Breast cancer Maternal Aunt     Malig Hyperthermia Neg Hx        Review of Systems    /76   Ht 157.5 cm (62.01\")   Wt 72.6 kg (160 lb)   BMI 29.25 kg/m²     Physical Exam    Diagnoses and all orders for this visit:    1. Routine gynecological examination (Primary)  -     POC Urinalysis Dipstick  -     IGP, Apt HPV,rfx 16 / 18,45  -     Mammo Screening Digital Tomosynthesis Bilateral With CAD; Future    2. Special screening examination for human papillomavirus (HPV)  -     IGP, Apt HPV,rfx 16 / 18,45    3. Pap smear, as part of routine gynecological examination  -     IGP, Apt HPV,rfx 16 / 18,45        Assessment     1) GYN annual well woman exam.   2) MMG ordered     Plan     1) Breast Health - " Clinical breast exam & mammogram yearly, Self breast awareness monthly  2) Pap - done today  3) Smoking status - Cara Ricketts  reports that she has been smoking cigarettes. She started smoking about 32 years ago. She has a 32 pack-year smoking history. She has been exposed to tobacco smoke. She has never used smokeless tobacco.   4) Follow up prn and one year    Encounter Diagnoses   Name Primary?    Routine gynecological examination Yes    Special screening examination for human papillomavirus (HPV)     Pap smear, as part of routine gynecological examination          Jake Humphreys MD  5/14/2025  14:22 EDT

## 2025-05-17 LAB
CYTOLOGIST CVX/VAG CYTO: NORMAL
CYTOLOGY CVX/VAG DOC CYTO: NORMAL
CYTOLOGY CVX/VAG DOC THIN PREP: NORMAL
DX ICD CODE: NORMAL
HPV I/H RISK 4 DNA CVX QL PROBE+SIG AMP: NEGATIVE
OTHER STN SPEC: NORMAL
SERVICE CMNT-IMP: NORMAL
STAT OF ADQ CVX/VAG CYTO-IMP: NORMAL

## 2025-05-19 DIAGNOSIS — R10.9 ABDOMINAL CRAMPING: ICD-10-CM

## 2025-05-19 DIAGNOSIS — K58.1 IRRITABLE BOWEL SYNDROME WITH CONSTIPATION: ICD-10-CM

## 2025-05-19 RX ORDER — HYOSCYAMINE SULFATE 0.38 MG/1
TABLET, EXTENDED RELEASE ORAL
Qty: 30 TABLET | Refills: 0 | Status: SHIPPED | OUTPATIENT
Start: 2025-05-19

## 2025-06-03 RX ORDER — DICYCLOMINE HYDROCHLORIDE 10 MG/1
CAPSULE ORAL
Qty: 120 CAPSULE | Refills: 0 | Status: SHIPPED | OUTPATIENT
Start: 2025-06-03

## 2025-06-05 DIAGNOSIS — R10.9 ABDOMINAL CRAMPING: ICD-10-CM

## 2025-06-05 DIAGNOSIS — K58.1 IRRITABLE BOWEL SYNDROME WITH CONSTIPATION: ICD-10-CM

## 2025-06-05 RX ORDER — HYOSCYAMINE SULFATE 0.38 MG/1
TABLET, EXTENDED RELEASE ORAL
Qty: 30 TABLET | Refills: 0 | Status: SHIPPED | OUTPATIENT
Start: 2025-06-05

## 2025-06-06 ENCOUNTER — TELEPHONE (OUTPATIENT)
Dept: OBSTETRICS AND GYNECOLOGY | Facility: CLINIC | Age: 50
End: 2025-06-06

## 2025-06-06 RX ORDER — ONDANSETRON 4 MG/1
4 TABLET, ORALLY DISINTEGRATING ORAL EVERY 8 HOURS PRN
Qty: 30 TABLET | Refills: 0 | Status: SHIPPED | OUTPATIENT
Start: 2025-06-06

## 2025-06-06 NOTE — TELEPHONE ENCOUNTER
"Per scheduling- DX IS INCORRECT. DX NEEDS TO BE \" Z12.31 ENCOUNTER FOR SCREENING MAMMO\" PLEASE ADVISE   "

## 2025-06-26 DIAGNOSIS — R10.9 ABDOMINAL CRAMPING: ICD-10-CM

## 2025-06-26 DIAGNOSIS — K58.1 IRRITABLE BOWEL SYNDROME WITH CONSTIPATION: ICD-10-CM

## 2025-06-26 RX ORDER — PROMETHAZINE HYDROCHLORIDE 12.5 MG/1
TABLET ORAL
Qty: 30 TABLET | Refills: 11 | Status: SHIPPED | OUTPATIENT
Start: 2025-06-26

## 2025-06-26 RX ORDER — HYOSCYAMINE SULFATE 0.38 MG/1
TABLET, EXTENDED RELEASE ORAL
Qty: 60 TABLET | Refills: 11 | Status: SHIPPED | OUTPATIENT
Start: 2025-06-26

## 2025-06-26 RX ORDER — ONDANSETRON 4 MG/1
4 TABLET, ORALLY DISINTEGRATING ORAL EVERY 8 HOURS PRN
Qty: 30 TABLET | Refills: 11 | Status: SHIPPED | OUTPATIENT
Start: 2025-06-26

## 2025-07-15 ENCOUNTER — OFFICE VISIT (OUTPATIENT)
Dept: GASTROENTEROLOGY | Facility: CLINIC | Age: 50
End: 2025-07-15
Payer: COMMERCIAL

## 2025-07-15 VITALS
WEIGHT: 151.2 LBS | HEIGHT: 62 IN | DIASTOLIC BLOOD PRESSURE: 80 MMHG | BODY MASS INDEX: 27.82 KG/M2 | SYSTOLIC BLOOD PRESSURE: 132 MMHG

## 2025-07-15 DIAGNOSIS — K58.1 IRRITABLE BOWEL SYNDROME WITH CONSTIPATION: ICD-10-CM

## 2025-07-15 DIAGNOSIS — K31.84 GASTROPARESIS: ICD-10-CM

## 2025-07-15 DIAGNOSIS — K21.00 GASTROESOPHAGEAL REFLUX DISEASE WITH ESOPHAGITIS WITHOUT HEMORRHAGE: Primary | ICD-10-CM

## 2025-07-15 PROBLEM — R19.5 ELEVATED FECAL CALPROTECTIN: Status: RESOLVED | Noted: 2025-02-19 | Resolved: 2025-07-15

## 2025-07-15 RX ORDER — PLECANATIDE 3 MG/1
1 TABLET ORAL DAILY
Qty: 30 TABLET | Refills: 3 | Status: SHIPPED | OUTPATIENT
Start: 2025-07-15

## 2025-07-15 NOTE — PROGRESS NOTES
PATIENT INFORMATION  Cara Ricketts       - 1975    CHIEF COMPLAINT  Chief Complaint   Patient presents with    Abdominal Pain    Constipation    Diarrhea    Vomiting    Nausea       HISTORY OF PRESENT ILLNESS    Here today for GERD follow-up     Per [facility]: Referral sent at [facility] for pelvic floor PT. She reports she is taking 8 mcg amitiza BID and then skip a few days with no BM and start with hard stools and then diarrhea for a few days. Hemorrhoids flared yesterday. Activia made her feel bad the next day.    TODAY: On 24 mcg amitiza BID and will skip a few days and start with hard stool and then progresses to water. Took miralax for 3 days and felt like she was doing a colon prep. She felt like the cramping was worse. On days she cleans out she feels the best without cramping, but she does have urgency. She cancelled pelvic physical therapy because she was sicl. Encouraged to call back to reschedule.      GERD: Voquezna 20 mg daily. Well controlled, nausea is improved. Sometimes is sick in mornings when side is cramping. She is eating small, frequent meals, diet.     3/6/25 GES 12%  2022 EGD with erosive gastritis, no H Pylori  2022 Colon with 1 HP polyp and poor prep, was unable to keep prep down.  8/10/2020 ERCP with stent removal   Cholecystectomy persistently felt bad until ERCP 10 yrs laterReferral sent at [facility] for pelvic floor PT. At [facility] started amitiza 8 mcg BID which she is non compliant with.     Her BP is uncontrolled, she reports its always high, she has minipress at home she is not taking, clonidine she takes PRN when she feels its high. She will take meds when she gets home and then recheck it. Reviewed severe risks with uncontrolled BP.          REVIEWED PERTINENT RESULTS/ LABS  Lab Results   Component Value Date    CASEREPORT  2025     Surgical Pathology Report                         Case: [id number]                                  Authorizing Provider:  Coco  Hari        Collected:           03/28/2025 02:12 PM                                 MD Juliet                                                                   Ordering Location:     [facility]   Received:            03/28/2025 04:36 PM                                 OR                                                                           Pathologist:           Armani Tinsley MD                                                         Specimens:   1) - Gastric, Antrum, Biopsies                                                                      2) - Esophagus, Distal, Biopsies                                                                    3) - Large Intestine, Sigmoid Colon, Sigmoid polyp x 2                                     FINALDX  03/28/2025     1.  Stomach, antrum, biopsy: Benign gastric mucosa with   -A. Mild chronic inflammation    -B. No intestinal metaplasia.   -C. No Helicobacter pylori.    -D.  Focal changes suggesting chemical gastritis   -E.  Focal erosion    2.  Esophagus, distal, biopsy: Benign squamous and gastric mucosa with-   -A. Chronic inflammation in the gastric mucosa.   -B. No intestinal metaplasia.   -C. No Helicobacter pylori.  -D.  Pancreatic metaplasia (nonspecific pathologic findings)    3.  Colon, sigmoid, biopsy: Hyperplastic polyps           Lab Results   Component Value Date    HGB 15.1 01/29/2025    MCV 98.1 (H) 01/29/2025     01/29/2025    ALT 20 01/29/2025    AST 22 01/29/2025    INR 1.1 06/24/2020      No results found.    REVIEW OF SYSTEMS  Review of Systems      ACTIVE PROBLEMS  Patient Active Problem List    Diagnosis     Nausea [R11.0]     RUQ pain [R10.11]     History of colon polyps [Z86.0100]     History of gallstones [Z87.19]     Decreased body weight [R63.4]     Abdominal pain, left lower quadrant [R10.32]     D (diarrhea) [R19.7]     Emesis [R11.10]     Acid reflux [K21.9]     BP (high blood pressure) [I10]     N&V (nausea and  vomiting) [R11.2]          PAST MEDICAL HISTORY  Past Medical History:   Diagnosis Date    Alcoholism     Anemia     Anxiety and depression     Asthma     COPD (chronic obstructive pulmonary disease)     Ectopic pregnancy     Gallstones     Hyperlipidemia     Hypertension     Migraine     Multiple gestation 4-26-96    Ovarian cyst     PONV (postoperative nausea and vomiting)     Substance abuse     TMJ (dislocation of temporomandibular joint)     Urinary tract infection          SURGICAL HISTORY  Past Surgical History:   Procedure Laterality Date    COLONOSCOPY      COLONOSCOPY W/ POLYPECTOMY N/A 6/28/2022    Procedure: Colonoscopy with polypectomy;  Surgeon: Zari Burnett DO;  Location: [facility];  Service: Gastroenterology;  Laterality: N/A;  poor prep  rectal polyps x2 (hot snare x1)    COLONOSCOPY W/ POLYPECTOMY N/A 3/28/2025    Procedure: COLONOSCOPY WITH POLYPECTOMY;  Surgeon: Hari Sepulveda MD;  Location: [facility] OR;  Service: Gastroenterology;  Laterality: N/A;  Poor Prep; Sigmoid polyp x 2;    EAR TUBES      ENDOSCOPY      ENDOSCOPY N/A 6/28/2022    Procedure: Esophagogastroduodenoscopy with biopsy;  Surgeon: Zari Burnett DO;  Location: [facility] OR;  Service: Gastroenterology;  Laterality: N/A;  GREGG test  erosive gastritis    ENDOSCOPY N/A 3/28/2025    Procedure: ESOPHAGOGASTRODUODENOSCOPY WITH BIOPSY;  Surgeon: Hari Sepulveda MD;  Location: [facility] OR;  Service: Gastroenterology;  Laterality: N/A;  Mild gastritis; Reflux esophagitis; Biopsies- gastric, distal esophagus    ERCP W/ PLASTIC STENT PLACEMENT      HYSTEROSCOPY ENDOMETRIAL ABLATION      LAPAROSCOPIC CHOLECYSTECTOMY      TUBAL ABDOMINAL LIGATION           FAMILY HISTORY  Family History   Problem Relation Age of Onset    Hypertension Father     Alzheimer's disease Paternal Grandmother     Emphysema Paternal Grandfather     Breast cancer Maternal Aunt     Malig Hyperthermia Neg Hx          SOCIAL HISTORY  Social  History     Occupational History    Not on file   Tobacco Use    Smoking status: Every Day     Current packs/day: 1.00     Average packs/day: 1 pack/day for 32.2 years (32.2 ttl pk-yrs)     Types: Cigarettes     Start date: 5/7/1993     Passive exposure: Current    Smokeless tobacco: Never    Tobacco comments:     NA  instructed not to smoke pre procedure  3/27/2025   Vaping Use    Vaping status: Never Used   Substance and Sexual Activity    Alcohol use: Not Currently    Drug use: No    Sexual activity: Yes     Partners: Male     Birth control/protection: Abstinence     Comment: e-sure, endometrial ablation         CURRENT MEDICATIONS    Current Outpatient Medications:     albuterol sulfate  (90 Base) MCG/ACT inhaler, Inhale 2 puffs., Disp: , Rfl:     cloNIDine (CATAPRES) 0.1 MG tablet, Take 1 tablet by mouth Every 6 (Six) Hours As Needed for High Blood Pressure., Disp: , Rfl:     dicyclomine (BENTYL) 10 MG capsule, TAKE 1 CAPSULE BY MOUTH 4 TIMES DAILY AS NEEDED FOR  ABDOMINAL  CRAMPING, Disp: 120 capsule, Rfl: 0    erythromycin ethylsuccinate 200 MG/5ML suspension, Take 3.1 mL by mouth 4 (Four) Times a Day With Meals & at Bedtime., Disp: 372 mL, Rfl: 3    Gemtesa 75 MG tablet, Take 1 tablet by mouth Daily., Disp: , Rfl:     hyoscyamine (LEVBID) 0.375 MG 12 hr tablet, TAKE 1 TABLET BY MOUTH EVERY 12 HOURS AS NEEDED FOR CRAMPS, Disp: 60 tablet, Rfl: 11    ondansetron ODT (ZOFRAN-ODT) 4 MG disintegrating tablet, DISSOLVE 1 TABLET IN MOUTH EVERY 8 HOURS AS NEEDED FOR NAUSEA AND VOMITING, Disp: 30 tablet, Rfl: 11    promethazine (PHENERGAN) 12.5 MG tablet, TAKE 1 TABLET BY MOUTH EVERY 6 HOURS AS NEEDED FOR NAUSEA FOR VOMITING, Disp: 30 tablet, Rfl: 11    traZODone (DESYREL) 100 MG tablet, Take 2 tablets by mouth every night at bedtime., Disp: , Rfl:     Vitamin D3 Maximum Strength 125 MCG (5000 UT) capsule capsule, Take 1 capsule by mouth Daily., Disp: , Rfl:     Vonoprazan Fumarate (Voquezna) 20 MG tablet, Take  "1 tablet by mouth once daily, Disp: 30 tablet, Rfl: 2    busPIRone (BUSPAR) 7.5 MG tablet, Take 1 tablet by mouth 3 (Three) Times a Day. (Patient not taking: Reported on 7/15/2025), Disp: , Rfl:     montelukast (SINGULAIR) 10 MG tablet, Take 1 tablet by mouth every night at bedtime. (Patient not taking: Reported on 7/15/2025), Disp: , Rfl:     Plecanatide (Trulance) 3 MG tablet, Take 1 tablet by mouth Daily., Disp: 30 tablet, Rfl: 3    ALLERGIES  Amoxicillin, Milk-related compounds, and Penicillins    VITALS  Vitals:    07/15/25 1028   BP: 132/80   BP Location: Left arm   Patient Position: Sitting   Cuff Size: Adult   Weight: 68.6 kg (151 lb 3.2 oz)   Height: 157.5 cm (62.01\")       PHYSICAL EXAM  Debilities/Disabilities Identified: None  Emotional Behavior: Appropriate  Wt Readings from Last 3 Encounters:   07/15/25 68.6 kg (151 lb 3.2 oz)   05/14/25 72.6 kg (160 lb)   05/12/25 72.4 kg (159 lb 9.6 oz)     Ht Readings from Last 1 Encounters:   07/15/25 157.5 cm (62.01\")     Body mass index is 27.65 kg/m².  Physical Exam  Constitutional:       General: She is not in acute distress.     Appearance: Normal appearance. She is not ill-appearing.   HENT:      Head: Normocephalic and atraumatic.      Mouth/Throat:      Mouth: Mucous membranes are moist.      Pharynx: No posterior oropharyngeal erythema.   Eyes:      General: No scleral icterus.  Cardiovascular:      Rate and Rhythm: Normal rate and regular rhythm.      Heart sounds: Normal heart sounds.   Pulmonary:      Effort: Pulmonary effort is normal.      Breath sounds: Normal breath sounds.   Abdominal:      General: Abdomen is flat. Bowel sounds are normal. There is no distension.      Palpations: Abdomen is soft. There is no mass.      Tenderness: There is abdominal tenderness in the right upper quadrant and right lower quadrant. There is no guarding or rebound. Negative signs include Trent's sign.      Hernia: No hernia is present.   Musculoskeletal:      " Cervical back: Neck supple.   Skin:     General: Skin is warm.      Capillary Refill: Capillary refill takes less than 2 seconds.   Neurological:      General: No focal deficit present.      Mental Status: She is alert and oriented to person, place, and time.   Psychiatric:         Mood and Affect: Mood normal.         Behavior: Behavior normal.         Thought Content: Thought content normal.         Judgment: Judgment normal.         CLINICAL DATA REVIEWED   reviewed previous lab results and integrated with today's visit, reviewed notes from other physicians and/or last GI encounter, reviewed previous endoscopy results and available photos, reviewed surgical pathology results from previous biopsies    ASSESSMENT  Diagnoses and all orders for this visit:    Gastroesophageal reflux disease with esophagitis without hemorrhage    Gastroparesis    Irritable bowel syndrome with constipation    Other orders  -     Plecanatide (Trulance) 3 MG tablet; Take 1 tablet by mouth Daily.          PLAN    Will start trulance, if continuing to skip days, add miralax daily.   GERD: Continue Voquenza  GP Continue GP diet, encouraged to resume buspar    Return in about 3 months (around 10/15/2025).    I have discussed the above plan with the patient.  They verbalize understanding and are in agreement with the plan.  They have been advised to contact the office for any questions, concerns, or changes related to their health.

## 2025-07-21 RX ORDER — VONOPRAZAN FUMARATE 26.72 MG/1
20 TABLET ORAL DAILY
Qty: 30 TABLET | Refills: 3 | Status: SHIPPED | OUTPATIENT
Start: 2025-07-21

## 2025-08-25 ENCOUNTER — HOSPITAL ENCOUNTER (OUTPATIENT)
Dept: MAMMOGRAPHY | Facility: HOSPITAL | Age: 50
Discharge: HOME | End: 2025-08-25
Admitting: OBSTETRICS & GYNECOLOGY
Payer: COMMERCIAL

## 2025-08-25 DIAGNOSIS — Z12.31 ENCOUNTER FOR SCREENING MAMMOGRAM FOR BREAST CANCER: ICD-10-CM

## 2025-08-25 PROCEDURE — 77063 BREAST TOMOSYNTHESIS BI: CPT | Performed by: RADIOLOGY

## 2025-08-25 PROCEDURE — 77067 SCR MAMMO BI INCL CAD: CPT

## 2025-08-25 PROCEDURE — 77067 SCR MAMMO BI INCL CAD: CPT | Performed by: RADIOLOGY

## 2025-08-25 PROCEDURE — 77063 BREAST TOMOSYNTHESIS BI: CPT

## (undated) DEVICE — FRCP BX RADJAW4 NDL 2.8 240CM LG OG BX40

## (undated) DEVICE — JACKT LAB F/R KNIT CUFF/COLR XLG BLU

## (undated) DEVICE — KT ORCA ORCAPOD DISP STRL

## (undated) DEVICE — Device

## (undated) DEVICE — THE BITE BLOCK MAXI, LATEX FREE STRAP IS USED TO PROTECT THE ENDOSCOPE INSERTION TUBE FROM BEING BITTEN BY THE PATIENT.

## (undated) DEVICE — BW-412T DISP COMBO CLEANING BRUSH: Brand: SINGLE USE COMBINATION CLEANING BRUSH

## (undated) DEVICE — SPNG GZ WOVN 4X4IN 12PLY 10/BX STRL

## (undated) DEVICE — SNAR POLYP SENSATION STDOVL 27 240 BX40

## (undated) DEVICE — ADAPT CLN BIOGUARD AIR/H2O DISP

## (undated) DEVICE — VIAL FORMALIN CAP 10P 40ML

## (undated) DEVICE — LAB CORP AGAR SLANT UREA PK/10

## (undated) DEVICE — SYR LL 3CC

## (undated) DEVICE — GLV SURG SENSICARE PI MIC PF SZ8 LF STRL

## (undated) DEVICE — SAFELINER SUCTION CANISTER 1000CC: Brand: DEROYAL

## (undated) DEVICE — PAD GRND E/S W/CORD SPLT A/

## (undated) DEVICE — LINER SURG CANSTR SXN S/RIGD 1500CC

## (undated) DEVICE — THE SINGLE USE ETRAP – POLYP TRAP IS USED FOR SUCTION RETRIEVAL OF ENDOSCOPICALLY REMOVED POLYPS.: Brand: ETRAP